# Patient Record
Sex: FEMALE | Employment: UNEMPLOYED | URBAN - METROPOLITAN AREA
[De-identification: names, ages, dates, MRNs, and addresses within clinical notes are randomized per-mention and may not be internally consistent; named-entity substitution may affect disease eponyms.]

---

## 2021-01-29 ENCOUNTER — IMMUNIZATION (OUTPATIENT)
Dept: FAMILY MEDICINE CLINIC | Age: 86
End: 2021-01-29
Payer: MEDICARE

## 2021-01-29 PROCEDURE — 91301 COVID-19, MODERNA VACCINE 100MCG/0.5ML DOSE: CPT | Performed by: INTERNAL MEDICINE

## 2021-01-29 PROCEDURE — 0011A COVID-19, MODERNA VACCINE 100MCG/0.5ML DOSE: CPT | Performed by: INTERNAL MEDICINE

## 2021-02-26 ENCOUNTER — IMMUNIZATION (OUTPATIENT)
Dept: FAMILY MEDICINE CLINIC | Age: 86
End: 2021-02-26
Payer: MEDICARE

## 2021-02-26 PROCEDURE — 0012A COVID-19, MODERNA VACCINE 100MCG/0.5ML DOSE: CPT | Performed by: INTERNAL MEDICINE

## 2021-02-26 PROCEDURE — 91301 COVID-19, MODERNA VACCINE 100MCG/0.5ML DOSE: CPT | Performed by: INTERNAL MEDICINE

## 2021-09-22 ENCOUNTER — APPOINTMENT (OUTPATIENT)
Dept: CARDIAC CATH/INVASIVE PROCEDURES | Age: 86
DRG: 242 | End: 2021-09-22
Payer: MEDICARE

## 2021-09-22 ENCOUNTER — APPOINTMENT (OUTPATIENT)
Dept: GENERAL RADIOLOGY | Age: 86
DRG: 242 | End: 2021-09-22
Payer: MEDICARE

## 2021-09-22 ENCOUNTER — HOSPITAL ENCOUNTER (INPATIENT)
Age: 86
LOS: 5 days | Discharge: HOME HEALTH | DRG: 242 | End: 2021-09-27
Attending: EMERGENCY MEDICINE | Admitting: INTERNAL MEDICINE
Payer: MEDICARE

## 2021-09-22 DIAGNOSIS — R00.1 BRADYCARDIA: ICD-10-CM

## 2021-09-22 DIAGNOSIS — I47.20 VENTRICULAR TACHYCARDIA: ICD-10-CM

## 2021-09-22 DIAGNOSIS — R55 SYNCOPE AND COLLAPSE: Primary | ICD-10-CM

## 2021-09-22 PROBLEM — R60.0 PEDAL EDEMA: Status: ACTIVE | Noted: 2021-09-22

## 2021-09-22 PROBLEM — J90 PLEURAL EFFUSION: Status: ACTIVE | Noted: 2021-09-22

## 2021-09-22 PROBLEM — I49.9 CARDIAC DYSRHYTHMIA: Status: ACTIVE | Noted: 2021-09-22
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
## 2021-09-22 LAB
ALBUMIN SERPL-MCNC: 3.6 G/DL (ref 3.5–5.2)
ALBUMIN/GLOBULIN RATIO: 1.1 (ref 1–2.5)
ALLEN TEST: ABNORMAL
ALLEN TEST: ABNORMAL
ALP BLD-CCNC: 106 U/L (ref 35–104)
ALT SERPL-CCNC: 25 U/L (ref 5–33)
ANION GAP SERPL CALCULATED.3IONS-SCNC: 13 MMOL/L (ref 9–17)
ANION GAP SERPL CALCULATED.3IONS-SCNC: 14 MMOL/L (ref 9–17)
ANION GAP SERPL CALCULATED.3IONS-SCNC: 16 MMOL/L (ref 9–17)
ANION GAP: 10 MMOL/L (ref 7–16)
ANION GAP: 18 MMOL/L (ref 7–16)
AST SERPL-CCNC: 28 U/L
BILIRUB SERPL-MCNC: 1.67 MG/DL (ref 0.3–1.2)
BILIRUBIN DIRECT: 0.65 MG/DL
BILIRUBIN, INDIRECT: 1.02 MG/DL (ref 0–1)
BNP INTERPRETATION: ABNORMAL
BUN BLDV-MCNC: 13 MG/DL (ref 8–23)
BUN/CREAT BLD: ABNORMAL (ref 9–20)
CALCIUM SERPL-MCNC: 9.5 MG/DL (ref 8.6–10.4)
CALCIUM SERPL-MCNC: 9.5 MG/DL (ref 8.6–10.4)
CALCIUM SERPL-MCNC: 9.7 MG/DL (ref 8.6–10.4)
CHLORIDE BLD-SCNC: 102 MMOL/L (ref 98–107)
CHLORIDE BLD-SCNC: 99 MMOL/L (ref 98–107)
CHLORIDE BLD-SCNC: 99 MMOL/L (ref 98–107)
CO2: 24 MMOL/L (ref 20–31)
CREAT SERPL-MCNC: 0.55 MG/DL (ref 0.5–0.9)
CREAT SERPL-MCNC: 0.58 MG/DL (ref 0.5–0.9)
CREAT SERPL-MCNC: 0.64 MG/DL (ref 0.5–0.9)
FIO2: ABNORMAL
FIO2: ABNORMAL
GFR AFRICAN AMERICAN: >60 ML/MIN
GFR NON-AFRICAN AMERICAN: >60 ML/MIN
GFR SERPL CREATININE-BSD FRML MDRD: >60 ML/MIN
GFR SERPL CREATININE-BSD FRML MDRD: >60 ML/MIN
GFR SERPL CREATININE-BSD FRML MDRD: ABNORMAL ML/MIN/{1.73_M2}
GFR SERPL CREATININE-BSD FRML MDRD: NORMAL ML/MIN/{1.73_M2}
GFR SERPL CREATININE-BSD FRML MDRD: NORMAL ML/MIN/{1.73_M2}
GLOBULIN: ABNORMAL G/DL (ref 1.5–3.8)
GLUCOSE BLD-MCNC: 134 MG/DL (ref 74–100)
GLUCOSE BLD-MCNC: 141 MG/DL (ref 70–99)
GLUCOSE BLD-MCNC: 141 MG/DL (ref 70–99)
GLUCOSE BLD-MCNC: 146 MG/DL (ref 74–100)
GLUCOSE BLD-MCNC: 181 MG/DL (ref 70–99)
HCO3 VENOUS: 27 MMOL/L (ref 22–29)
HCT VFR BLD CALC: 49 % (ref 36.3–47.1)
HEMOGLOBIN: 15.8 G/DL (ref 11.9–15.1)
LACTIC ACID, WHOLE BLOOD: 2.1 MMOL/L (ref 0.7–2.1)
LV EF: 38 %
LVEF MODALITY: NORMAL
MAGNESIUM: 2.2 MG/DL (ref 1.6–2.6)
MAGNESIUM: 2.4 MG/DL (ref 1.6–2.6)
MAGNESIUM: 2.5 MG/DL (ref 1.6–2.6)
MCH RBC QN AUTO: 32.3 PG (ref 25.2–33.5)
MCHC RBC AUTO-ENTMCNC: 32.2 G/DL (ref 28.4–34.8)
MCV RBC AUTO: 100.2 FL (ref 82.6–102.9)
MODE: ABNORMAL
MODE: ABNORMAL
NEGATIVE BASE EXCESS, ART: 1 (ref 0–2)
NEGATIVE BASE EXCESS, VEN: ABNORMAL (ref 0–2)
NRBC AUTOMATED: 0 PER 100 WBC
O2 DEVICE/FLOW/%: ABNORMAL
O2 DEVICE/FLOW/%: ABNORMAL
O2 SAT, VEN: 43 % (ref 60–85)
PATIENT TEMP: ABNORMAL
PATIENT TEMP: ABNORMAL
PCO2, VEN: 33.7 MM HG (ref 41–51)
PDW BLD-RTO: 13.9 % (ref 11.8–14.4)
PH VENOUS: 7.51 (ref 7.32–7.43)
PLATELET # BLD: ABNORMAL K/UL (ref 138–453)
PLATELET, FLUORESCENCE: 154 K/UL (ref 138–453)
PLATELET, IMMATURE FRACTION: 15.2 % (ref 1.1–10.3)
PMV BLD AUTO: ABNORMAL FL (ref 8.1–13.5)
PO2, VEN: 21.4 MM HG (ref 30–50)
POC BUN: 10 MG/DL (ref 8–26)
POC BUN: 14 MG/DL (ref 8–26)
POC CHLORIDE: 114 MMOL/L (ref 98–107)
POC CHLORIDE: 98 MMOL/L (ref 98–107)
POC CREATININE: 0.52 MG/DL (ref 0.51–1.19)
POC CREATININE: 0.65 MG/DL (ref 0.51–1.19)
POC HCO3: 21.5 MMOL/L (ref 21–28)
POC HEMATOCRIT: 38 % (ref 36–46)
POC HEMATOCRIT: 52 % (ref 36–46)
POC HEMOGLOBIN: 13.1 G/DL (ref 12–16)
POC HEMOGLOBIN: 17.8 G/DL (ref 12–16)
POC IONIZED CALCIUM: 1.06 MMOL/L (ref 1.15–1.33)
POC IONIZED CALCIUM: 1.12 MMOL/L (ref 1.15–1.33)
POC LACTIC ACID: 1.23 MMOL/L (ref 0.56–1.39)
POC LACTIC ACID: 3.58 MMOL/L (ref 0.56–1.39)
POC O2 SATURATION: 98 % (ref 94–98)
POC PCO2 TEMP: ABNORMAL MM HG
POC PCO2 TEMP: ABNORMAL MM HG
POC PCO2: 29.4 MM HG (ref 35–48)
POC PH TEMP: ABNORMAL
POC PH TEMP: ABNORMAL
POC PH: 7.47 (ref 7.35–7.45)
POC PO2 TEMP: ABNORMAL MM HG
POC PO2 TEMP: ABNORMAL MM HG
POC PO2: 91.2 MM HG (ref 83–108)
POC POTASSIUM: 2.3 MMOL/L (ref 3.5–4.5)
POC POTASSIUM: 3 MMOL/L (ref 3.5–4.5)
POC SODIUM: 142 MMOL/L (ref 138–146)
POC SODIUM: 145 MMOL/L (ref 138–146)
POC TCO2: 22 MMOL/L (ref 22–30)
POC TCO2: 27 MMOL/L (ref 22–30)
POSITIVE BASE EXCESS, ART: ABNORMAL (ref 0–3)
POSITIVE BASE EXCESS, VEN: 4 (ref 0–3)
POTASSIUM SERPL-SCNC: 3.1 MMOL/L (ref 3.7–5.3)
POTASSIUM SERPL-SCNC: 3.3 MMOL/L (ref 3.7–5.3)
POTASSIUM SERPL-SCNC: 3.7 MMOL/L (ref 3.7–5.3)
PRO-BNP: 6816 PG/ML
RBC # BLD: 4.89 M/UL (ref 3.95–5.11)
SAMPLE SITE: ABNORMAL
SAMPLE SITE: ABNORMAL
SARS-COV-2, RAPID: NOT DETECTED
SODIUM BLD-SCNC: 137 MMOL/L (ref 135–144)
SODIUM BLD-SCNC: 139 MMOL/L (ref 135–144)
SODIUM BLD-SCNC: 139 MMOL/L (ref 135–144)
SPECIMEN DESCRIPTION: NORMAL
TCO2 (CALC), ART: ABNORMAL MMOL/L (ref 22–29)
THYROXINE, FREE: 3.08 NG/DL (ref 0.93–1.7)
TOTAL CO2, VENOUS: ABNORMAL MMOL/L (ref 23–30)
TOTAL PROTEIN: 6.8 G/DL (ref 6.4–8.3)
TROPONIN INTERP: ABNORMAL
TROPONIN INTERP: ABNORMAL
TROPONIN T: ABNORMAL NG/ML
TROPONIN T: ABNORMAL NG/ML
TROPONIN, HIGH SENSITIVITY: 24 NG/L (ref 0–14)
TROPONIN, HIGH SENSITIVITY: 24 NG/L (ref 0–14)
TSH SERPL DL<=0.05 MIU/L-ACNC: 0.11 MIU/L (ref 0.3–5)
WBC # BLD: 12.9 K/UL (ref 3.5–11.3)

## 2021-09-22 PROCEDURE — 6360000002 HC RX W HCPCS: Performed by: STUDENT IN AN ORGANIZED HEALTH CARE EDUCATION/TRAINING PROGRAM

## 2021-09-22 PROCEDURE — 71045 X-RAY EXAM CHEST 1 VIEW: CPT

## 2021-09-22 PROCEDURE — 82330 ASSAY OF CALCIUM: CPT

## 2021-09-22 PROCEDURE — 36415 COLL VENOUS BLD VENIPUNCTURE: CPT

## 2021-09-22 PROCEDURE — 84484 ASSAY OF TROPONIN QUANT: CPT

## 2021-09-22 PROCEDURE — 82803 BLOOD GASES ANY COMBINATION: CPT

## 2021-09-22 PROCEDURE — 87635 SARS-COV-2 COVID-19 AMP PRB: CPT

## 2021-09-22 PROCEDURE — 93005 ELECTROCARDIOGRAM TRACING: CPT | Performed by: EMERGENCY MEDICINE

## 2021-09-22 PROCEDURE — 2580000003 HC RX 258: Performed by: STUDENT IN AN ORGANIZED HEALTH CARE EDUCATION/TRAINING PROGRAM

## 2021-09-22 PROCEDURE — 96375 TX/PRO/DX INJ NEW DRUG ADDON: CPT

## 2021-09-22 PROCEDURE — 83605 ASSAY OF LACTIC ACID: CPT

## 2021-09-22 PROCEDURE — B2111ZZ FLUOROSCOPY OF MULTIPLE CORONARY ARTERIES USING LOW OSMOLAR CONTRAST: ICD-10-PCS | Performed by: INTERNAL MEDICINE

## 2021-09-22 PROCEDURE — 99223 1ST HOSP IP/OBS HIGH 75: CPT | Performed by: INTERNAL MEDICINE

## 2021-09-22 PROCEDURE — 82947 ASSAY GLUCOSE BLOOD QUANT: CPT

## 2021-09-22 PROCEDURE — 81001 URINALYSIS AUTO W/SCOPE: CPT

## 2021-09-22 PROCEDURE — 2500000003 HC RX 250 WO HCPCS

## 2021-09-22 PROCEDURE — 85055 RETICULATED PLATELET ASSAY: CPT

## 2021-09-22 PROCEDURE — 5A1223Z PERFORMANCE OF CARDIAC PACING, CONTINUOUS: ICD-10-PCS | Performed by: INTERNAL MEDICINE

## 2021-09-22 PROCEDURE — 99283 EMERGENCY DEPT VISIT LOW MDM: CPT

## 2021-09-22 PROCEDURE — 6360000002 HC RX W HCPCS

## 2021-09-22 PROCEDURE — 4A023N7 MEASUREMENT OF CARDIAC SAMPLING AND PRESSURE, LEFT HEART, PERCUTANEOUS APPROACH: ICD-10-PCS | Performed by: INTERNAL MEDICINE

## 2021-09-22 PROCEDURE — 02HK3JZ INSERTION OF PACEMAKER LEAD INTO RIGHT VENTRICLE, PERCUTANEOUS APPROACH: ICD-10-PCS | Performed by: INTERNAL MEDICINE

## 2021-09-22 PROCEDURE — 94660 CPAP INITIATION&MGMT: CPT

## 2021-09-22 PROCEDURE — 93454 CORONARY ARTERY ANGIO S&I: CPT | Performed by: INTERNAL MEDICINE

## 2021-09-22 PROCEDURE — 80051 ELECTROLYTE PANEL: CPT

## 2021-09-22 PROCEDURE — 2709999900 HC NON-CHARGEABLE SUPPLY

## 2021-09-22 PROCEDURE — 84520 ASSAY OF UREA NITROGEN: CPT

## 2021-09-22 PROCEDURE — C1760 CLOSURE DEV, VASC: HCPCS

## 2021-09-22 PROCEDURE — 6360000004 HC RX CONTRAST MEDICATION

## 2021-09-22 PROCEDURE — 84439 ASSAY OF FREE THYROXINE: CPT

## 2021-09-22 PROCEDURE — 2000000000 HC ICU R&B

## 2021-09-22 PROCEDURE — 83735 ASSAY OF MAGNESIUM: CPT

## 2021-09-22 PROCEDURE — 84443 ASSAY THYROID STIM HORMONE: CPT

## 2021-09-22 PROCEDURE — 6360000002 HC RX W HCPCS: Performed by: EMERGENCY MEDICINE

## 2021-09-22 PROCEDURE — 85027 COMPLETE CBC AUTOMATED: CPT

## 2021-09-22 PROCEDURE — 83880 ASSAY OF NATRIURETIC PEPTIDE: CPT

## 2021-09-22 PROCEDURE — 2060000000 HC ICU INTERMEDIATE R&B

## 2021-09-22 PROCEDURE — 6370000000 HC RX 637 (ALT 250 FOR IP): Performed by: STUDENT IN AN ORGANIZED HEALTH CARE EDUCATION/TRAINING PROGRAM

## 2021-09-22 PROCEDURE — C1894 INTRO/SHEATH, NON-LASER: HCPCS

## 2021-09-22 PROCEDURE — 2500000003 HC RX 250 WO HCPCS: Performed by: STUDENT IN AN ORGANIZED HEALTH CARE EDUCATION/TRAINING PROGRAM

## 2021-09-22 PROCEDURE — 82565 ASSAY OF CREATININE: CPT

## 2021-09-22 PROCEDURE — 2580000003 HC RX 258: Performed by: EMERGENCY MEDICINE

## 2021-09-22 PROCEDURE — 93306 TTE W/DOPPLER COMPLETE: CPT

## 2021-09-22 PROCEDURE — 80048 BASIC METABOLIC PNL TOTAL CA: CPT

## 2021-09-22 PROCEDURE — 96365 THER/PROPH/DIAG IV INF INIT: CPT

## 2021-09-22 PROCEDURE — 85014 HEMATOCRIT: CPT

## 2021-09-22 PROCEDURE — 80076 HEPATIC FUNCTION PANEL: CPT

## 2021-09-22 RX ORDER — ACETAMINOPHEN 325 MG/1
650 TABLET ORAL EVERY 4 HOURS PRN
Status: DISCONTINUED | OUTPATIENT
Start: 2021-09-22 | End: 2021-09-22 | Stop reason: SDUPTHER

## 2021-09-22 RX ORDER — SODIUM CHLORIDE 0.9 % (FLUSH) 0.9 %
5-40 SYRINGE (ML) INJECTION PRN
Status: DISCONTINUED | OUTPATIENT
Start: 2021-09-22 | End: 2021-09-22 | Stop reason: SDUPTHER

## 2021-09-22 RX ORDER — NOREPINEPHRINE BIT/0.9 % NACL 16MG/250ML
2-100 INFUSION BOTTLE (ML) INTRAVENOUS CONTINUOUS
Status: DISCONTINUED | OUTPATIENT
Start: 2021-09-22 | End: 2021-09-23

## 2021-09-22 RX ORDER — SODIUM CHLORIDE 0.9 % (FLUSH) 0.9 %
5-40 SYRINGE (ML) INJECTION EVERY 12 HOURS SCHEDULED
Status: DISCONTINUED | OUTPATIENT
Start: 2021-09-22 | End: 2021-09-22 | Stop reason: SDUPTHER

## 2021-09-22 RX ORDER — ONDANSETRON 2 MG/ML
4 INJECTION INTRAMUSCULAR; INTRAVENOUS ONCE
Status: COMPLETED | OUTPATIENT
Start: 2021-09-22 | End: 2021-09-22

## 2021-09-22 RX ORDER — POTASSIUM CHLORIDE 20 MEQ/1
40 TABLET, EXTENDED RELEASE ORAL ONCE
Status: COMPLETED | OUTPATIENT
Start: 2021-09-22 | End: 2021-09-22

## 2021-09-22 RX ORDER — ONDANSETRON 4 MG/1
4 TABLET, ORALLY DISINTEGRATING ORAL EVERY 8 HOURS PRN
Status: DISCONTINUED | OUTPATIENT
Start: 2021-09-22 | End: 2021-09-27 | Stop reason: HOSPADM

## 2021-09-22 RX ORDER — SODIUM CHLORIDE 9 MG/ML
25 INJECTION, SOLUTION INTRAVENOUS PRN
Status: DISCONTINUED | OUTPATIENT
Start: 2021-09-22 | End: 2021-09-27 | Stop reason: HOSPADM

## 2021-09-22 RX ORDER — ESCITALOPRAM OXALATE 5 MG/1
5 TABLET ORAL DAILY
COMMUNITY

## 2021-09-22 RX ORDER — HYDRALAZINE HYDROCHLORIDE 20 MG/ML
10 INJECTION INTRAMUSCULAR; INTRAVENOUS EVERY 6 HOURS PRN
Status: DISCONTINUED | OUTPATIENT
Start: 2021-09-22 | End: 2021-09-27 | Stop reason: HOSPADM

## 2021-09-22 RX ORDER — DOPAMINE HYDROCHLORIDE 160 MG/100ML
INJECTION, SOLUTION INTRAVENOUS
Status: DISCONTINUED
Start: 2021-09-22 | End: 2021-09-23

## 2021-09-22 RX ORDER — ONDANSETRON 2 MG/ML
INJECTION INTRAMUSCULAR; INTRAVENOUS
Status: COMPLETED
Start: 2021-09-22 | End: 2021-09-22

## 2021-09-22 RX ORDER — ACETAMINOPHEN 325 MG/1
650 TABLET ORAL EVERY 6 HOURS PRN
Status: DISCONTINUED | OUTPATIENT
Start: 2021-09-22 | End: 2021-09-27 | Stop reason: HOSPADM

## 2021-09-22 RX ORDER — ONDANSETRON 2 MG/ML
4 INJECTION INTRAMUSCULAR; INTRAVENOUS EVERY 6 HOURS PRN
Status: DISCONTINUED | OUTPATIENT
Start: 2021-09-22 | End: 2021-09-27 | Stop reason: HOSPADM

## 2021-09-22 RX ORDER — POTASSIUM CHLORIDE 7.45 MG/ML
40 INJECTION INTRAVENOUS ONCE
Status: COMPLETED | OUTPATIENT
Start: 2021-09-22 | End: 2021-09-22

## 2021-09-22 RX ORDER — AMIODARONE HYDROCHLORIDE 200 MG/1
400 TABLET ORAL DAILY
Status: ON HOLD | COMMUNITY
End: 2021-09-27 | Stop reason: HOSPADM

## 2021-09-22 RX ORDER — LOSARTAN POTASSIUM AND HYDROCHLOROTHIAZIDE 12.5; 5 MG/1; MG/1
1 TABLET ORAL DAILY
COMMUNITY

## 2021-09-22 RX ORDER — SODIUM CHLORIDE 0.9 % (FLUSH) 0.9 %
5-40 SYRINGE (ML) INJECTION EVERY 12 HOURS SCHEDULED
Status: DISCONTINUED | OUTPATIENT
Start: 2021-09-22 | End: 2021-09-27 | Stop reason: HOSPADM

## 2021-09-22 RX ORDER — SODIUM CHLORIDE 0.9 % (FLUSH) 0.9 %
5-40 SYRINGE (ML) INJECTION PRN
Status: DISCONTINUED | OUTPATIENT
Start: 2021-09-22 | End: 2021-09-27 | Stop reason: HOSPADM

## 2021-09-22 RX ORDER — 0.9 % SODIUM CHLORIDE 0.9 %
500 INTRAVENOUS SOLUTION INTRAVENOUS ONCE
Status: COMPLETED | OUTPATIENT
Start: 2021-09-22 | End: 2021-09-22

## 2021-09-22 RX ORDER — ACETAMINOPHEN 650 MG/1
650 SUPPOSITORY RECTAL EVERY 6 HOURS PRN
Status: DISCONTINUED | OUTPATIENT
Start: 2021-09-22 | End: 2021-09-27 | Stop reason: HOSPADM

## 2021-09-22 RX ORDER — SODIUM CHLORIDE 9 MG/ML
25 INJECTION, SOLUTION INTRAVENOUS PRN
Status: DISCONTINUED | OUTPATIENT
Start: 2021-09-22 | End: 2021-09-22 | Stop reason: SDUPTHER

## 2021-09-22 RX ORDER — LIDOCAINE HYDROCHLORIDE ANHYDROUS AND DEXTROSE MONOHYDRATE .4; 5 G/100ML; G/100ML
1 INJECTION, SOLUTION INTRAVENOUS CONTINUOUS
Status: DISCONTINUED | OUTPATIENT
Start: 2021-09-22 | End: 2021-09-23

## 2021-09-22 RX ORDER — POTASSIUM CHLORIDE 7.45 MG/ML
20 INJECTION INTRAVENOUS ONCE
Status: COMPLETED | OUTPATIENT
Start: 2021-09-22 | End: 2021-09-22

## 2021-09-22 RX ORDER — CALCIUM GLUCONATE 20 MG/ML
2000 INJECTION, SOLUTION INTRAVENOUS ONCE
Status: COMPLETED | OUTPATIENT
Start: 2021-09-22 | End: 2021-09-22

## 2021-09-22 RX ORDER — MAGNESIUM SULFATE IN WATER 40 MG/ML
2000 INJECTION, SOLUTION INTRAVENOUS ONCE
Status: COMPLETED | OUTPATIENT
Start: 2021-09-22 | End: 2021-09-22

## 2021-09-22 RX ORDER — DOPAMINE HYDROCHLORIDE 160 MG/100ML
2-20 INJECTION, SOLUTION INTRAVENOUS CONTINUOUS
Status: DISCONTINUED | OUTPATIENT
Start: 2021-09-22 | End: 2021-09-23

## 2021-09-22 RX ORDER — METOPROLOL SUCCINATE 50 MG/1
50 TABLET, EXTENDED RELEASE ORAL 2 TIMES DAILY
Status: ON HOLD | COMMUNITY
End: 2021-09-27 | Stop reason: HOSPADM

## 2021-09-22 RX ORDER — FUROSEMIDE 10 MG/ML
20 INJECTION INTRAMUSCULAR; INTRAVENOUS ONCE
Status: COMPLETED | OUTPATIENT
Start: 2021-09-22 | End: 2021-09-22

## 2021-09-22 RX ORDER — POLYETHYLENE GLYCOL 3350 17 G/17G
17 POWDER, FOR SOLUTION ORAL DAILY PRN
Status: DISCONTINUED | OUTPATIENT
Start: 2021-09-22 | End: 2021-09-27 | Stop reason: HOSPADM

## 2021-09-22 RX ORDER — POTASSIUM CHLORIDE 750 MG/1
40 CAPSULE, EXTENDED RELEASE ORAL DAILY
Status: DISCONTINUED | OUTPATIENT
Start: 2021-09-22 | End: 2021-09-22

## 2021-09-22 RX ADMIN — SODIUM CHLORIDE, PRESERVATIVE FREE 10 ML: 5 INJECTION INTRAVENOUS at 20:04

## 2021-09-22 RX ADMIN — ONDANSETRON: 2 INJECTION INTRAMUSCULAR; INTRAVENOUS at 10:36

## 2021-09-22 RX ADMIN — POTASSIUM CHLORIDE 40 MEQ: 7.46 INJECTION, SOLUTION INTRAVENOUS at 12:19

## 2021-09-22 RX ADMIN — HYDRALAZINE HYDROCHLORIDE 10 MG: 20 INJECTION INTRAMUSCULAR; INTRAVENOUS at 16:56

## 2021-09-22 RX ADMIN — FUROSEMIDE 20 MG: 10 INJECTION, SOLUTION INTRAMUSCULAR; INTRAVENOUS at 21:10

## 2021-09-22 RX ADMIN — POTASSIUM CHLORIDE 20 MEQ: 7.46 INJECTION, SOLUTION INTRAVENOUS at 15:59

## 2021-09-22 RX ADMIN — LIDOCAINE HYDROCHLORIDE 1 MG/MIN: 4 INJECTION, SOLUTION INTRAVENOUS at 12:25

## 2021-09-22 RX ADMIN — LIDOCAINE HYDROCHLORIDE 100 MG: 20 INJECTION INTRAVENOUS at 12:25

## 2021-09-22 RX ADMIN — POTASSIUM CHLORIDE 40 MEQ: 1500 TABLET, EXTENDED RELEASE ORAL at 15:47

## 2021-09-22 RX ADMIN — FAMOTIDINE 20 MG: 10 INJECTION INTRAVENOUS at 20:04

## 2021-09-22 RX ADMIN — CALCIUM GLUCONATE 2000 MG: 20 INJECTION, SOLUTION INTRAVENOUS at 11:12

## 2021-09-22 RX ADMIN — ONDANSETRON 4 MG: 2 INJECTION, SOLUTION INTRAMUSCULAR; INTRAVENOUS at 13:11

## 2021-09-22 RX ADMIN — AMIODARONE HYDROCHLORIDE 1 MG/MIN: 50 INJECTION, SOLUTION INTRAVENOUS at 10:58

## 2021-09-22 RX ADMIN — SODIUM CHLORIDE 500 ML: 0.9 INJECTION, SOLUTION INTRAVENOUS at 10:57

## 2021-09-22 RX ADMIN — SODIUM CHLORIDE, PRESERVATIVE FREE 40 ML: 5 INJECTION INTRAVENOUS at 20:11

## 2021-09-22 RX ADMIN — MAGNESIUM SULFATE HEPTAHYDRATE 2000 MG: 40 INJECTION, SOLUTION INTRAVENOUS at 10:58

## 2021-09-22 RX ADMIN — DOPAMINE HYDROCHLORIDE 2 MCG/KG/MIN: 160 INJECTION, SOLUTION INTRAVENOUS at 12:31

## 2021-09-22 RX ADMIN — ONDANSETRON: 2 INJECTION, SOLUTION INTRAMUSCULAR; INTRAVENOUS at 10:36

## 2021-09-22 ASSESSMENT — ENCOUNTER SYMPTOMS
SORE THROAT: 0
BACK PAIN: 0
VOMITING: 0
EYE REDNESS: 0
SHORTNESS OF BREATH: 0
RHINORRHEA: 0
ABDOMINAL DISTENTION: 0
DIARRHEA: 0
NAUSEA: 1
WHEEZING: 0
COUGH: 0
BLOOD IN STOOL: 0
CHEST TIGHTNESS: 0
PHOTOPHOBIA: 0
CONSTIPATION: 0
VOMITING: 1

## 2021-09-22 ASSESSMENT — PAIN SCALES - GENERAL: PAINLEVEL_OUTOF10: 0

## 2021-09-22 NOTE — H&P
Attending Supervising Physicians Attestation Statement  I have seen and examined Gaurang Delcid and the key elements of all parts of the encounter have been performed by me. I agree with the assessment, plan and orders as documented by the Advanced Practice Provider. I discussed the findings and plans with the resident physician and agree as documented in their note . See resident note for more details    In addition to the pertinent positives and negatives as stated within HPI and the review of systems as documented in the notes, all other systems were reviewed when able to and are reported negative. Additional Comments:   81 F presented with fatigue  Found to be in V tach, given amio converted to NSR  Bradycardic in ER  Underwent emergent pacemaker placement and cath    Principal Problem:    Ventricular tachycardia ([address]  Active Problems:    Cardiac dysrhythmia    Syncope and collapse    Severe sinus bradycardia  Resolved Problems:    * No resolved hospital problems.  *    - cardiology consulted - s/p pacemaker/cath  - on dopamine, lidocaine gtt  - continue ICU monitoring     Electronically signed by Swetha Taylor MD on 9/22/2021 at 5:42 PM

## 2021-09-22 NOTE — Clinical Note
Patient Class: Inpatient [101]   REQUIRED: Diagnosis: Cardiac dysrhythmia [712193]   Estimated Length of Stay: Estimated stay of more than 2 midnights   Telemetry/Cardiac Monitoring Required?: No

## 2021-09-22 NOTE — ED NOTES
EPOC being run at bedside. Patient with symptomatic run of vtach, Dr Yu Campbell aware.      Maggie Smith, RN  09/22/21 [id number]

## 2021-09-22 NOTE — CONSULTS
Attestation signed by      Attending Physician Statement:    I have discussed the care of  Juana Rowell , including pertinent history and exam findings, with the Cardiology fellow/resident. I have seen and examined the patient and the key elements of all parts of the encounter have been performed by me. I agree with the assessment, plan and orders as documented by the fellow/resident, after I modified exam findings and plan of treatments, and the final version is my approved version of the assessment. Additional Comments:     70-year-old female with known history of paroxysmal atrial fibrillation, on amiodarone and Eliquis at home in addition to metoprolol, not following with any cardiologist admitted with 2 episodes of sudden unprovoked syncope. No chest pain, palpitations, dizziness or lightheadedness prior to the episodes. Was noted to be in V-tach as per EMS on initial evaluation. Was given 150 mg of amiodarone with conversion to sinus rhythm. On arrival to ED she was noted to be markedly bradycardic with heart rate ranging 30-40's along with frequent PVCs in bigeminal manner along with frequent runs of nonsustained ventricular tachycardia likely secondary to R on T phenomenan. Initial labs showed potassium of 3.1 and  High sensitivity troponins of 24. Will proceed with emergent temporary pacemaker placement due to severe bradycardia and bradycardia induced torsades. Hold amiodarone and lopressor. IV lidocaine in case recurrent VTs. Will need ischemia workup with coronary angiography post temporary pacemaker. Will also consult EP for further  Evaluation. Risks and benefits of temporary pacemaker placement were discussed with patient along with explanation of potential complications. She and her family verbalized understanding and willing to proceed.        [facility]   Consultation Note               Today's Date: 9/22/2021  Patient Name: Juana Rowell  Date of sodium chloride 0.9 % 250 mL infusion, 2-100 mcg/min, IntraVENous, Continuous  potassium chloride (MICRO-K) extended release capsule 40 mEq, 40 mEq, Oral, Daily  lidocaine (CARDIAC INFUSION) 2 g in dextrose 5% 500 mL infusion, 1 mg/min, IntraVENous, Continuous  DOPamine (INTROPIN) 400 mg in dextrose 5 % 250 mL infusion, 2-20 mcg/kg/min, IntraVENous, Continuous      Allergies:  Patient has no known allergies. Social History:   reports that she has never smoked. She has never used smokeless tobacco.       Family History: family history is not on file. No h/o sudden cardiac death. No for premature CAD      REVIEW OF SYSTEMS:    Constitutional: there has been no unanticipated weight loss. Eyes: No visual changes or diplopia. ENT: No Headaches  Cardiovascular: see above  Respiratory: No previous pulmonary problems, No cough  Gastrointestinal: No abdominal pain. No change in bowel or bladder habits. Genitourinary: No dysuria, trouble voiding, or hematuria. Musculoskeletal:  No gait disturbance, No weakness or joint complaints. Integumentary: No rash or pruritis. Neurological: No headache, diplopia      PHYSICAL EXAM:      BP (!) 87/42   Pulse (!) 47   Temp 96.6 °F (35.9 °C)   Resp 17   Ht 5' 5\" (1.651 m)   Wt 165 lb (74.8 kg)   SpO2 95%   BMI 27.46 kg/m²    Constitutional and General Appearance: Alert, no distress  Respiratory:  No increased work of breathing. Clear to auscultation bilaterally. No wheeze or crackles. Cardiovascular:  Normal S1 and S2.   Jugular venous pulsation Normal  Abdomen:   Soft  No tenderness  Extremities:  No lower extremity edema  Neurologic:  Alert and oriented.   Moves all extremities well      DATA:    Diagnostics:    Labs:     CBC:   Recent Labs     09/22/21  1042   WBC 12.9*   HGB 15.8*   HCT 49.0*   PLT See Reflexed IPF Result     BMP:   Recent Labs     09/22/21  1042 09/22/21  1046     --    K 3.1*  --    CO2 24  --    BUN 13  --    CREATININE 0.64 0.65 LABGLOM >60 >60   GLUCOSE 141*  --      BNP: No results for input(s): BNP in the last 72 hours. PT/INR: No results for input(s): PROTIME, INR in the last 72 hours. APTT:No results for input(s): APTT in the last 72 hours. CARDIAC ENZYMES:  Recent Labs     09/22/21  1042   TROPHS 24*     No results for input(s): CKTOTAL, CKMB, CKMBINDEX, TROPONINI in the last 72 hours. Invalid input(s):  TROPONINT  Recent Labs     09/22/21  1042   TROPONINT NOT REPORTED     FASTING LIPID PANEL:No results found for: HDL, LDLDIRECT, LDLCALC, TRIG  LIVER PROFILE:No results for input(s): AST, ALT, LABALBU in the last 72 hours. EKG: bradycardia with bigemny. Strips concerning for bradycardia induced Torsades        IMPRESSION:    Syncope  Bradycardia  Vtach, concerns for torsades  Hypotension  Inadvertent amiodarone dosing    Patient Active Problem List   Diagnosis    Cardiac dysrhythmia    Syncope and collapse         RECOMMENDATIONS:  Discontinue amiodarone gtt. Switch to lidocaine gtt. Will take patient for temporary pacing given potential bradycardia induced torsades  TTE  Patient took eliquis this morning. Hold eliquis for now          Thank you for allowing us to participate in [facility]. Will follow with you.       Electronically signed on 09/22/21 at 1:24 PM by:    Forrest Swanson MD, MD   Fellow, [address]

## 2021-09-22 NOTE — ED NOTES
Patient to ED via EMS with c/o syncopal episode with two runs of vtach. Patient initially lost consciousness and fell, striking the back of her head on the floor from standing, this was witnessed by her daughters who are at the bedside. The patient then lost consciousness for approx 15 seconds per EMS en route while in vtach and converted on her own. The patient arrives alert and oriented x4, placed in gown and on full cardiac monitor. She reports she was feeling dizzy and having nausea yesterday. The patient is greeted in room 26 by Dr Altagracia Perea, placed on full cardiac monitor and on the lifepack.        Anabell Stearns RN  09/22/21 0374

## 2021-09-22 NOTE — ED NOTES
Patient transported to cath lab via stretcher by cath team on lifepack.      Joaquina Rubio RN  09/22/21 0875

## 2021-09-22 NOTE — H&P
[address]     Department of Internal Medicine - Staff Internal Medicine Teaching Service          ADMISSION NOTE/HISTORY AND PHYSICAL EXAMINATION   Date: 9/22/2021  Patient Name: Shreya Sorto  Date of admission: 9/22/2021 10:22 AM  YOB: 1935  PCP: Lisha Chan MD  History Obtained From:  Patient and EMR    CHIEF COMPLAINT     Chief complaint: Syncopal episode    HISTORY OF PRESENTING ILLNESS     The patient is a pleasant 81 y.o. female presents with a chief complaint of syncopal episode. The patient has PMH significant of hypertension. The patient reported that she has been feeling well since yesterday with decreased appetite, nausea and dry heaving. The patient's daughter called EMS this morning after she had a syncopal episode. She reported feeling dizzy after which she \"blacked out\" and fell and hit her head on the carpet. The patient did not have any abnormal shaking or jerking movements or urinary or fecal incontinence. Per EMS, the patient was in sustained V. tach and she was given 150 mg of amiodarone after which V. tach was spontaneously resolved. Patient reported taking all her medications in the morning. Patient denies any cardiac history or following up with any cardiologist.    In the ED, the patient found to be bradycardic HR 48, RR 25, /52 SPO2 92% on room air. EKG showed bradycardia bigeminy no ST elevation was seen. Labs showed K 3.1, Trop 24, Pro-BNP 6816, WBC 12.9, Hgb 15.8, Plt 154. Patient was started on amiodarone drip and potassium 100 mEq, Mag 2g and calcium 2g was replaced. Cardiology was consulted and patient was having 4-8 beat runs of V. Tach in the ED. Subsequently, patient's blood pressure was dropped to 77/65. Cardiolgy stopped amiodarone and switched to Dopamine and also resumed Lidocaine. Patient was taken for emergent temporary pacemaker for possible bradycardic induced torsades and left heart cath.   Patient was admitted to cardiac ICU for further management. Review of Systems:  Review of Systems   Constitutional: Negative for chills, fatigue and fever. HENT: Negative for mouth sores, rhinorrhea and sneezing. Eyes: Negative for photophobia, redness and visual disturbance. Respiratory: Negative for cough, chest tightness and shortness of breath. Cardiovascular: Positive for leg swelling. Negative for chest pain and palpitations. Gastrointestinal: Positive for nausea. Negative for blood in stool, diarrhea and vomiting. Endocrine: Negative for polydipsia, polyphagia and polyuria. Genitourinary: Negative for dysuria, frequency and urgency. Musculoskeletal: Negative for arthralgias, back pain and neck pain. Neurological: Positive for syncope. Negative for tremors, seizures, numbness and headaches. Psychiatric/Behavioral: Negative for agitation, behavioral problems and confusion. PAST MEDICAL HISTORY     History reviewed. No pertinent past medical history. PAST SURGICAL HISTORY     History reviewed. No pertinent surgical history. ALLERGIES     Patient has no known allergies. MEDICATIONS PRIOR TO ADMISSION     Prior to Admission medications    Medication Sig Start Date End Date Taking?  Authorizing Provider   apixaban (ELIQUIS) 5 MG TABS tablet Take 5 mg by mouth 2 times daily   Yes Historical Provider, MD   losartan-hydroCHLOROthiazide (HYZAAR) 50-12.5 MG per tablet Take 1 tablet by mouth daily   Yes Historical Provider, MD   amiodarone (CORDARONE) 200 MG tablet Take 400 mg by mouth daily   Yes Historical Provider, MD   metoprolol succinate (TOPROL XL) 50 MG extended release tablet Take 50 mg by mouth 2 times daily    Yes Historical Provider, MD   Calcium-Vitamin D 600-200 MG-UNIT TABS Take 1 tablet by mouth 2 times daily    Yes Historical Provider, MD   escitalopram (LEXAPRO) 5 MG tablet Take 5 mg by mouth daily  Patient not taking: Reported on 9/22/2021    Historical Provider, MD SOCIAL HISTORY     Tobacco: Never smoked  Alcohol: Quit many years ago  Illicits: Denies use  Occupation: N/A    FAMILY HISTORY     History reviewed. No pertinent family history. PHYSICAL EXAM     Vitals: BP (!) 115/58   Pulse 60   Temp 98.4 °F (36.9 °C) (Oral)   Resp 16   Ht 5' 5\" (1.651 m)   Wt 165 lb (74.8 kg)   SpO2 96%   BMI 27.46 kg/m²   Tmax: Temp (24hrs), Av.1 °F (36.2 °C), Min:95.7 °F (35.4 °C), Max:98.4 °F (36.9 °C)    Last Body weight:   Wt Readings from Last 3 Encounters:   21 165 lb (74.8 kg)     Body Mass Index : Body mass index is 27.46 kg/m². PHYSICAL EXAMINATION:  Physical Exam  Constitutional:       General: She is not in acute distress. Appearance: She is not ill-appearing. HENT:      Head: Normocephalic and atraumatic. Cardiovascular:      Rate and Rhythm: Normal rate and regular rhythm. Pulses: Normal pulses. Heart sounds: No murmur heard. No gallop. Pulmonary:      Effort: Pulmonary effort is normal. No respiratory distress. Breath sounds: Normal breath sounds. No wheezing. Abdominal:      General: Bowel sounds are normal. There is no distension. Palpations: Abdomen is soft. Tenderness: There is no abdominal tenderness. There is no guarding. Musculoskeletal:         General: Normal range of motion. Right lower leg: Edema present. Left lower leg: Edema present. Neurological:      General: No focal deficit present. Mental Status: She is alert and oriented to person, place, and time. Psychiatric:         Mood and Affect: Mood normal.         Behavior: Behavior normal.         Thought Content:  Thought content normal.           INVESTIGATIONS     Laboratory Testing:     Recent Results (from the past 24 hour(s))   CBC    Collection Time: 21 10:42 AM   Result Value Ref Range    WBC 12.9 (H) 3.5 - 11.3 k/uL    RBC 4.89 3.95 - 5.11 m/uL    Hemoglobin 15.8 (H) 11.9 - 15.1 g/dL    Hematocrit 49.0 (H) 36.3 - (H) <0.31 mg/dL    Bilirubin, Indirect 1.02 (H) 0.00 - 1.00 mg/dL    Total Protein 6.8 6.4 - 8.3 g/dL    Globulin NOT REPORTED 1.5 - 3.8 g/dL    Albumin/Globulin Ratio 1.1 1.0 - 2.5   Venous Blood Gas, POC    Collection Time: 09/22/21 10:46 AM   Result Value Ref Range    pH, Jesus Manuel 7.511 (H) 7.320 - 7.430    pCO2, Jesus Manuel 33.7 (L) 41.0 - 51.0 mm Hg    pO2, Jesus Manuel 21.4 (L) 30 - 50 mm Hg    HCO3, Venous 27.0 22.0 - 29.0 mmol/L    Total CO2, Venous NOT REPORTED 23.0 - 30.0 mmol/L    Negative Base Excess, Jesus Manuel NOT REPORTED 0.0 - 2.0    Positive Base Excess, Jesus Manuel 4 (H) 0.0 - 3.0    O2 Sat, Jesus Manuel 43 (L) 60.0 - 85.0 %    O2 Device/Flow/% NOT REPORTED     John Test NOT REPORTED     Sample Site NOT REPORTED     Mode NOT REPORTED     FIO2 NOT REPORTED     Pt Temp NOT REPORTED     POC pH Temp NOT REPORTED     POC pCO2 Temp NOT REPORTED mm Hg    POC pO2 Temp NOT REPORTED mm Hg   ELECTROLYTES PLUS    Collection Time: 09/22/21 10:46 AM   Result Value Ref Range    POC Sodium 142 138 - 146 mmol/L    POC Potassium 3.0 (L) 3.5 - 4.5 mmol/L    POC Chloride 98 98 - 107 mmol/L    POC TCO2 27 22 - 30 mmol/L    Anion Gap 18 (H) 7 - 16 mmol/L   Hemoglobin and hematocrit, blood    Collection Time: 09/22/21 10:46 AM   Result Value Ref Range    POC Hemoglobin 17.8 (H) 12.0 - 16.0 g/dL    POC Hematocrit 52 (H) 36 - 46 %   Creatinine W/GFR Point of Care    Collection Time: 09/22/21 10:46 AM   Result Value Ref Range    POC Creatinine 0.65 0.51 - 1.19 mg/dL    GFR Comment >60 >60 mL/min    GFR Non-African American >60 >60 mL/min    GFR Comment         CALCIUM, IONIC (POC)    Collection Time: 09/22/21 10:46 AM   Result Value Ref Range    POC Ionized Calcium 1.12 (L) 1.15 - 1.33 mmol/L   POCT urea (BUN)    Collection Time: 09/22/21 10:46 AM   Result Value Ref Range    POC BUN 14 8 - 26 mg/dL   Lactic Acid, POC    Collection Time: 09/22/21 10:46 AM   Result Value Ref Range    POC Lactic Acid 3.58 (H) 0.56 - 1.39 mmol/L   POCT Glucose    Collection Time: 09/22/21 10:46 AM   Result Value Ref Range    POC Glucose 146 (H) 74 - 100 mg/dL   LACTIC ACID, WHOLE BLOOD    Collection Time: 09/22/21 12:53 PM   Result Value Ref Range    Lactic Acid, Whole Blood 2.1 0.7 - 2.1 mmol/L   COVID-19, Rapid    Collection Time: 09/22/21  1:10 PM    Specimen: Nasopharyngeal Swab   Result Value Ref Range    Specimen Description . NASOPHARYNGEAL SWAB     SARS-CoV-2, Rapid Not Detected Not Detected   Arterial Blood Gas, POC    Collection Time: 09/22/21  2:03 PM   Result Value Ref Range    POC pH 7.471 (H) 7.350 - 7.450    POC pCO2 29.4 (L) 35.0 - 48.0 mm Hg    POC PO2 91.2 83.0 - 108.0 mm Hg    POC HCO3 21.5 21.0 - 28.0 mmol/L    TCO2 (calc), Art NOT REPORTED 22.0 - 29.0 mmol/L    Negative Base Excess, Art 1 0.0 - 2.0    Positive Base Excess, Art NOT REPORTED 0.0 - 3.0    POC O2 SAT 98 94.0 - 98.0 %    O2 Device/Flow/% NOT REPORTED     John Test NOT REPORTED     Sample Site NOT REPORTED     Mode NOT REPORTED     FIO2 NOT REPORTED     Pt Temp NOT REPORTED     POC pH Temp NOT REPORTED     POC pCO2 Temp NOT REPORTED mm Hg    POC pO2 Temp NOT REPORTED mm Hg   ELECTROLYTES PLUS    Collection Time: 09/22/21  2:03 PM   Result Value Ref Range    POC Sodium 145 138 - 146 mmol/L    POC Potassium 2.3 (LL) 3.5 - 4.5 mmol/L    POC Chloride 114 (H) 98 - 107 mmol/L    POC TCO2 22 22 - 30 mmol/L    Anion Gap 10 7 - 16 mmol/L   Hemoglobin and hematocrit, blood    Collection Time: 09/22/21  2:03 PM   Result Value Ref Range    POC Hemoglobin 13.1 12.0 - 16.0 g/dL    POC Hematocrit 38 36 - 46 %   Creatinine W/GFR Point of Care    Collection Time: 09/22/21  2:03 PM   Result Value Ref Range    POC Creatinine 0.52 0.51 - 1.19 mg/dL    GFR Comment >60 >60 mL/min    GFR Non-African American >60 >60 mL/min    GFR Comment         CALCIUM, IONIC (POC)    Collection Time: 09/22/21  2:03 PM   Result Value Ref Range    POC Ionized Calcium 1.06 (L) 1.15 - 1.33 mmol/L   POCT urea (BUN)    Collection Time: 09/22/21  2:03 PM   Result Value Ref Range    POC BUN 10 8 - 26 mg/dL   Lactic Acid, POC    Collection Time: 09/22/21  2:03 PM   Result Value Ref Range    POC Lactic Acid 1.23 0.56 - 1.39 mmol/L   POCT Glucose    Collection Time: 09/22/21  2:03 PM   Result Value Ref Range    POC Glucose 134 (H) 74 - 100 mg/dL   Basic Metabolic Panel w/ Reflex to MG    Collection Time: 09/22/21  3:14 PM   Result Value Ref Range    Glucose 141 (H) 70 - 99 mg/dL    BUN 13 8 - 23 mg/dL    CREATININE 0.55 0.50 - 0.90 mg/dL    Bun/Cre Ratio NOT REPORTED 9 - 20    Calcium 9.7 8.6 - 10.4 mg/dL    Sodium 137 135 - 144 mmol/L    Potassium 3.3 (L) 3.7 - 5.3 mmol/L    Chloride 99 98 - 107 mmol/L    CO2 24 20 - 31 mmol/L    Anion Gap 14 9 - 17 mmol/L    GFR Non-African American >60 >60 mL/min    GFR African American >60 >60 mL/min    GFR Comment          GFR Staging NOT REPORTED    Magnesium    Collection Time: 09/22/21  3:14 PM   Result Value Ref Range    Magnesium 2.5 1.6 - 2.6 mg/dL   Troponin    Collection Time: 09/22/21  3:14 PM   Result Value Ref Range    Troponin, High Sensitivity 24 (H) 0 - 14 ng/L    Troponin T NOT REPORTED <0.03 ng/mL    Troponin Interp NOT REPORTED        Imaging:   XR CHEST PORTABLE    Result Date: 9/22/2021  Bilateral pleural effusions with bibasilar opacities that could represent atelectasis or infection       ASSESSMENT & PLAN     ASSESSMENT / PLAN:     IMPRESSION  This is a 81 y.o. female who presented with syncopal episode and found to have V.tach    Principal Problem:    Ventricular tachycardia ([address]  Active Problems:    Cardiac dysrhythmia    Syncope and collapse    Severe sinus bradycardia    Fall at home    Bilateral Pleural effusion    Pedal edema  Resolved Problems:    * No resolved hospital problems. *      V. Tach  - s/p emergent temporary placement due to severe bradycardia induced torsades and LHC today  - LHC: Mild CAD; medical therapy for CAD  - Echo 09/22: LVEF 35-40% and evidence of diastolic dysfunction  - Cardiology following, will follow up recommedations  - EP consulted, will follow-up    Severe sinus bradycardia  - Patient's HR in 30s-40s in ED; ECG showed sinus bradycardia  - s/p emergent temporary placement due to severe bradycardia induced torsades   - patient currently in paced rhythm HR 60s  - Continue Lidocaine gtt per cardiology  - EP consulted, will follow-up    Syncope   - likely due to bradycardia at home  - CT Head ordered, will follow up since patient is on chronic AC at home  - Echo 09/22: LVEF 35-40% and evidence of diastolic dysfunction  - Cardiology following, will follow up recommedations    Bilateral Pleural effusion  - CXR   - Echo 09/22: LVEF 35-40% and evidence of diastolic dysfunction    Paroxysmal A.fib (on Eliquis 5 mg BID)  - Patient is in paced rhythm after pacemaker  - Hold Amiodarone for now per Cardiology      DVT ppx: EPC Cuffs  GI ppx: Pepcid    PT/OT: On board  Discharge Planning:  consulted, will follow-up    Niya Houston MD  Internal Medicine Resident, PGY-1  [address] [facility];  [place], [place]  9/22/2021, 7:45 PM

## 2021-09-22 NOTE — ED PROVIDER NOTES
[facility] ED  Emergency Department        Pt Name: Preeti Montero  MRN: [id number]  [place] 1935  Date of evaluation: 9/22/21    CHIEF COMPLAINT       Chief Complaint   Patient presents with    Loss of Consciousness       HISTORY OF PRESENT ILLNESS  (Location/Symptom, Timing/Onset, Context/Setting, Quality, Duration, ModifyingFactors, Severity.)      Preeti Montero is a 81 y.o. female who presents with feeling unwell yesterday. Decreased appetite and dry heaving and nausea throughout the day. Called EMS this morning as patient continued to feel unwell. Had a syncopal episode after patient was placed on monitor noticed to be in V. tach. Patient then spontaneously resolved was given 150 of amiodarone by EMS. Arrives still feeling unwell heart rate in the 50s. Patient follows with Dr. Tracie Calvillo on PCP is on amiodarone metoprolol as well as Eliquis. Does not have a cardiologist has not had cardiac issues in the past.  Has not started any new recent medications. PAST MEDICAL / SURGICAL / SOCIAL / FAMILY HISTORY      has no past medical history on file. has no past surgical history on file.        Social History     Socioeconomic History    Marital status:      Spouse name: Not on file    Number of children: Not on file    Years of education: Not on file    Highest education level: Not on file   Occupational History    Not on file   Tobacco Use    Smoking status: Never Smoker    Smokeless tobacco: Never Used   Substance and Sexual Activity    Alcohol use: Not on file    Drug use: Not on file    Sexual activity: Not on file   Other Topics Concern    Not on file   Social History Narrative    Not on file     Social Determinants of Health     Financial Resource Strain:     Difficulty of Paying Living Expenses:    Food Insecurity:     Worried About Running Out of Food in the Last Year:     [address] in the Last Year:    Transportation Needs:     Lack of Transportation (Medical):  Lack of Transportation (Non-Medical):    Physical Activity:     Days of Exercise per Week:     Minutes of Exercise per Session:    Stress:     Feeling of Stress :    Social Connections:     Frequency of Communication with Friends and Family:     Frequency of Social Gatherings with Friends and Family:     Attends Taoist Services:     Active Member of Clubs or Organizations:     Attends Club or Organization Meetings:     Marital Status:    Intimate Partner Violence:     Fear of Current or Ex-Partner:     Emotionally Abused:     Physically Abused:     Sexually Abused:        History reviewed. No pertinent family history. Allergies:  Patient has no known allergies. Home Medications:  Prior to Admission medications    Medication Sig Start Date End Date Taking? Authorizing Provider   apixaban (ELIQUIS) 5 MG TABS tablet Take 5 mg by mouth 2 times daily   Yes Historical Provider, MD   escitalopram (LEXAPRO) 5 MG tablet Take 5 mg by mouth daily   Yes Historical Provider, MD   losartan-hydroCHLOROthiazide (HYZAAR) 50-12.5 MG per tablet Take 1 tablet by mouth daily   Yes Historical Provider, MD   amiodarone (CORDARONE) 200 MG tablet Take 400 mg by mouth daily   Yes Historical Provider, MD   metoprolol succinate (TOPROL XL) 100 MG extended release tablet Take 100 mg by mouth daily   Yes Historical Provider, MD   Calcium-Vitamin D 600-200 MG-UNIT TABS Take 1 tablet by mouth 2 times daily    Yes Historical Provider, MD       REVIEW OF SYSTEMS    (2-9 systems for level 4, 10 or more for level 5)      Review of Systems   Constitutional: Positive for activity change and appetite change. Negative for fatigue and fever. HENT: Negative for congestion, rhinorrhea and sore throat. Respiratory: Negative for cough, shortness of breath and wheezing. Cardiovascular: Negative for chest pain, palpitations and leg swelling. Gastrointestinal: Positive for nausea and vomiting. Negative for abdominal distention, constipation and diarrhea. Genitourinary: Negative for decreased urine volume and dysuria. Skin: Negative for rash. Neurological: Positive for dizziness and syncope. Negative for weakness, light-headedness, numbness and headaches. PHYSICAL EXAM   (up to 7 for level 4, 8 or more for level 5)     INITIAL VITALS:   BP (!) 87/42   Pulse (!) 47   Temp 96.6 °F (35.9 °C)   Resp 17   Ht 5' 5\" (1.651 m)   Wt 165 lb (74.8 kg)   SpO2 95%   BMI 27.46 kg/m²     Physical Exam  Constitutional:       General: She is not in acute distress. Appearance: Normal appearance. She is not ill-appearing. Comments: Patient nontoxic-appearing, having intermittent nonsustained V. tach episodes on EKG and on monitor. HENT:      Head: Normocephalic and atraumatic. Eyes:      General:         Right eye: No discharge. Left eye: No discharge. Extraocular Movements: Extraocular movements intact. Pupils: Pupils are equal, round, and reactive to light. Cardiovascular:      Rate and Rhythm: Normal rate. Pulmonary:      Effort: Pulmonary effort is normal. No respiratory distress. Musculoskeletal:      Right lower leg: No edema. Left lower leg: No edema. Neurological:      General: No focal deficit present. Mental Status: She is alert and oriented to person, place, and time. Cranial Nerves: No cranial nerve deficit. Sensory: No sensory deficit. Motor: No weakness. DIFFERENTIAL  DIAGNOSIS     Patient having episodes of nonsustained V. tach. Had a sustained episode with syncope per EMS. On EKG appears to be in bigeminy. We will plan on amiodarone drip, check electrolytes we will replace magnesium and calcium at this time. Assess for additional etiology, check thyroid. Patient is already on amiodarone at home as well as Eliquis. Per her PCP but do not have access to their notes. We will plan on cardiology consultation.   And admission.   We will plan on chest x-ray as patient does describe some \"fluid in her lungs\"    PLAN (LABS / Jacqualyn Few / EKG):  Orders Placed This Encounter   Procedures    COVID-19, Rapid    XR CHEST PORTABLE    CBC    BASIC METABOLIC PANEL    Troponin    Brain Natriuretic Peptide    MAGNESIUM    TSH with Reflex    Urinalysis with microscopic    ELECTROLYTES PLUS    Hemoglobin and hematocrit, blood    CALCIUM, IONIC (POC)    Immature Platelet Fraction    T4, Free    LACTIC ACID, WHOLE BLOOD    Troponin    Height and weight    Inpatient consult to Cardiology    Inpatient consult to Critical Care    Inpatient consult to Internal Medicine    POC CHEMISTRY (NA,K,ICA,GLU,CALC HCT/HGB,LACTATE,CREA,CL)    Venous Blood Gas, POC    Creatinine W/GFR Point of Care    POCT urea (BUN)    Lactic Acid, POC    POCT Glucose    EKG 12 Lead    ECHO Complete 2D W Doppler W Color    Insert peripheral IV    PATIENT STATUS (FROM ED OR OR/PROCEDURAL) Inpatient    PATIENT STATUS (FROM ED OR OR/PROCEDURAL) Inpatient       MEDICATIONS ORDERED:  Orders Placed This Encounter   Medications    ondansetron (ZOFRAN) 4 MG/2ML injection     MARK SMITH: cabinet override    DISCONTD: amiodarone (CORDARONE) 450 mg in dextrose 5 % 250 mL infusion    DISCONTD: amiodarone (CORDARONE) 450 mg in dextrose 5 % 250 mL infusion    ondansetron (ZOFRAN) injection 4 mg    calcium gluconate 2000 mg in sodium chloride 100 mL    0.9 % sodium chloride bolus    magnesium sulfate 2000 mg in 50 mL IVPB premix    norepinephrine (LEVOPHED) 16 mg in sodium chloride 0.9 % 250 mL infusion    potassium chloride 10 mEq/100 mL IVPB (Peripheral Line)    potassium chloride (MICRO-K) extended release capsule 40 mEq    lidocaine (cardiac) (XYLOCAINE) injection 100 mg    lidocaine (CARDIAC INFUSION) 2 g in dextrose 5% 500 mL infusion    DOPamine (INTROPIN) 400 mg in dextrose 5 % 250 mL infusion    DOPamine (INTROPIN) 1.6-5 MG/ML-% infusion     Annabelle Mustafa: cabinet override    ondansetron (ZOFRAN) injection 4 mg       DIAGNOSTIC RESULTS / EMERGENCY DEPARTMENT COURSE / MDM     LABS:  Results for orders placed or performed during the hospital encounter of 09/22/21   CBC   Result Value Ref Range    WBC 12.9 (H) 3.5 - 11.3 k/uL    RBC 4.89 3.95 - 5.11 m/uL    Hemoglobin 15.8 (H) 11.9 - 15.1 g/dL    Hematocrit 49.0 (H) 36.3 - 47.1 %    .2 82.6 - 102.9 fL    MCH 32.3 25.2 - 33.5 pg    MCHC 32.2 28.4 - 34.8 g/dL    RDW 13.9 11.8 - 14.4 %    Platelets See Reflexed IPF Result 138 - 453 k/uL    MPV NOT REPORTED 8.1 - 13.5 fL    NRBC Automated 0.0 0.0 per 100 WBC   BASIC METABOLIC PANEL   Result Value Ref Range    Glucose 141 (H) 70 - 99 mg/dL    BUN 13 8 - 23 mg/dL    CREATININE 0.64 0.50 - 0.90 mg/dL    Bun/Cre Ratio NOT REPORTED 9 - 20    Calcium 9.5 8.6 - 10.4 mg/dL    Sodium 139 135 - 144 mmol/L    Potassium 3.1 (L) 3.7 - 5.3 mmol/L    Chloride 99 98 - 107 mmol/L    CO2 24 20 - 31 mmol/L    Anion Gap 16 9 - 17 mmol/L    GFR Non-African American >60 >60 mL/min    GFR African American >60 >60 mL/min    GFR Comment          GFR Staging NOT REPORTED    Troponin   Result Value Ref Range    Troponin, High Sensitivity 24 (H) 0 - 14 ng/L    Troponin T NOT REPORTED <0.03 ng/mL    Troponin Interp NOT REPORTED    Brain Natriuretic Peptide   Result Value Ref Range    Pro-BNP 6,816 (H) <300 pg/mL    BNP Interpretation Pro-BNP Reference Range:    MAGNESIUM   Result Value Ref Range    Magnesium 2.2 1.6 - 2.6 mg/dL   TSH with Reflex   Result Value Ref Range    TSH 0.11 (L) 0.30 - 5.00 mIU/L   ELECTROLYTES PLUS   Result Value Ref Range    POC Sodium 142 138 - 146 mmol/L    POC Potassium 3.0 (L) 3.5 - 4.5 mmol/L    POC Chloride 98 98 - 107 mmol/L    POC TCO2 27 22 - 30 mmol/L    Anion Gap 18 (H) 7 - 16 mmol/L   Hemoglobin and hematocrit, blood   Result Value Ref Range    POC Hemoglobin 17.8 (H) 12.0 - 16.0 g/dL    POC Hematocrit 52 (H) 36 - 46 % CALCIUM, IONIC (POC)   Result Value Ref Range    POC Ionized Calcium 1.12 (L) 1.15 - 1.33 mmol/L   Immature Platelet Fraction   Result Value Ref Range    Platelet, Immature Fraction 15.2 (H) 1.1 - 10.3 %    Platelet, Fluorescence 154 138 - 453 k/uL   T4, Free   Result Value Ref Range    Thyroxine, Free 3.08 (H) 0.93 - 1.70 ng/dL   LACTIC ACID, WHOLE BLOOD   Result Value Ref Range    Lactic Acid, Whole Blood 2.1 0.7 - 2.1 mmol/L   Venous Blood Gas, POC   Result Value Ref Range    pH, Jesus Manuel 7.511 (H) 7.320 - 7.430    pCO2, Jesus Manuel 33.7 (L) 41.0 - 51.0 mm Hg    pO2, Jesus Manuel 21.4 (L) 30 - 50 mm Hg    HCO3, Venous 27.0 22.0 - 29.0 mmol/L    Total CO2, Venous NOT REPORTED 23.0 - 30.0 mmol/L    Negative Base Excess, Jesus Manuel NOT REPORTED 0.0 - 2.0    Positive Base Excess, Jesus Manuel 4 (H) 0.0 - 3.0    O2 Sat, Jesus Manuel 43 (L) 60.0 - 85.0 %    O2 Device/Flow/% NOT REPORTED     John Test NOT REPORTED     Sample Site NOT REPORTED     Mode NOT REPORTED     FIO2 NOT REPORTED     Pt Temp NOT REPORTED     POC pH Temp NOT REPORTED     POC pCO2 Temp NOT REPORTED mm Hg    POC pO2 Temp NOT REPORTED mm Hg   Creatinine W/GFR Point of Care   Result Value Ref Range    POC Creatinine 0.65 0.51 - 1.19 mg/dL    GFR Comment >60 >60 mL/min    GFR Non-African American >60 >60 mL/min    GFR Comment         POCT urea (BUN)   Result Value Ref Range    POC BUN 14 8 - 26 mg/dL   Lactic Acid, POC   Result Value Ref Range    POC Lactic Acid 3.58 (H) 0.56 - 1.39 mmol/L   POCT Glucose   Result Value Ref Range    POC Glucose 146 (H) 74 - 100 mg/dL       IMPRESSION: VT, braqdycardia    RADIOLOGY:  XR CHEST PORTABLE   Final Result   Bilateral pleural effusions with bibasilar opacities that could represent   atelectasis or infection             EKG:  EKG showing bradycardic bigeminy, poor R wave progression is noted, Q waves in the septal leads with septal infarct of age undetermined, no ST elevations noted.     POC ULTRASOUND:      EMERGENCY DEPARTMENT COURSE:  11:44 AM EDT  Patient getting meds, and o2 sat low and on o2 now, bnp elevated,   Spoke with cardiology fellow who will be down to speak with patient and evaluate . Patient continues to have episodes of NSVT. Pressure remains >596 systolic    96:14 PM EDT  Patients BP low at this time, and cardiology fellow at bedside. 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 Patient admitted to medicine to go to cardiac ICU    1:28 PM EDT  Patient Taken for temp pacemaker. PROCEDURES:      CONSULTS:  IP CONSULT TO CARDIOLOGY  IP CONSULT TO CRITICAL CARE  IP CONSULT TO INTERNAL MEDICINE  IP CONSULT TO CASE MANAGEMENT    CRITICAL CARE:  45 minutes    FINAL IMPRESSION      1. Syncope and collapse    2. Bradycardia    3. Ventricular tachycardia ([address]          DISPOSITION / PLAN     DISPOSITION        PATIENT REFERRED TO:  No follow-up provider specified.     DISCHARGE MEDICATIONS:  New Prescriptions    No medications on file       Deni Lou DO  1:27 PM    Attending Emergency Physician  [facility] ED    (Please note that portions of this note were completed with a voice recognition program.  Effortswere made to edit the dictations but occasionally words are mis-transcribed.)             Brenden Jones, DO  09/22/21 3793

## 2021-09-22 NOTE — PROGRESS NOTES
Pharmacist Note for Home Medication Reconciliation     Pharmacist reviewed home medications with patient and home med list was updated, some discrepancies was discovered please note the following:    Medications added: none      Medications not taking per patient:  Lexapro       Medication changes in doses or instructions:   Metoprolol succinate 50mg - 2 tablets by mouth daily      Recommendations: Metoprolol succinate is typically dosed one tablet once a day. Would recommend to change home medication to metoprolol succinate 100mg 1 tablet daily.      Thank you,   Fer Webb, PharmD 9/22/2021 5:53 PM

## 2021-09-22 NOTE — PLAN OF CARE
Problem: Skin Integrity:  Goal: Will show no infection signs and symptoms  Description: Will show no infection signs and symptoms  9/22/2021 1937 by Corie Tracy RN  Outcome: Ongoing  9/22/2021 1553 by Clearance PATRICK Allred  Outcome: Ongoing  Goal: Absence of new skin breakdown  Description: Absence of new skin breakdown  9/22/2021 1937 by Corie Tracy RN  Outcome: Ongoing  9/22/2021 1553 by Clearance PATRICK Allred  Outcome: Ongoing     Problem: Falls - Risk of:  Goal: Will remain free from falls  Description: Will remain free from falls  9/22/2021 1937 by Corie Tracy RN  Outcome: Ongoing  9/22/2021 1553 by Clearance PATRICK Allred  Outcome: Ongoing  Goal: Absence of physical injury  Description: Absence of physical injury  9/22/2021 1937 by Corie Tracy RN  Outcome: Ongoing  9/22/2021 1553 by Prabhakar Allred RN  Outcome: Ongoing     Problem: Infection:  Goal: Will remain free from infection  Description: Will remain free from infection  9/22/2021 1937 by Corie Tracy RN  Outcome: Ongoing  9/22/2021 1553 by Prabhakar Allred RN  Outcome: Ongoing     Problem: Safety:  Goal: Free from accidental physical injury  Description: Free from accidental physical injury  9/22/2021 1937 by Corie Tracy RN  Outcome: Ongoing  9/22/2021 1553 by Prabhakar Allred RN  Outcome: Ongoing  Goal: Free from intentional harm  Description: Free from intentional harm  9/22/2021 1937 by Corie Tracy RN  Outcome: Ongoing  9/22/2021 1553 by Prabhakar Allred RN  Outcome: Ongoing     Problem: Daily Care:  Goal: Daily care needs are met  Description: Daily care needs are met  9/22/2021 1937 by Corie Tracy RN  Outcome: Ongoing  9/22/2021 1553 by Prabhakar Allred RN  Outcome: Ongoing     Problem: Pain:  Goal: Patient's pain/discomfort is manageable  Description: Patient's pain/discomfort is manageable  9/22/2021 1937 by Corie Tracy RN  Outcome: Ongoing  9/22/2021 1553 by Prabhakar Allred RN  Outcome: Ongoing     Problem: Skin Integrity:  Goal: Skin integrity will stabilize  Description: Skin integrity will stabilize  9/22/2021 1937 by Janell Weinstein RN  Outcome: Ongoing  9/22/2021 1553 by Litzy Dacosta RN  Outcome: Ongoing     Problem: Discharge Planning:  Goal: Patients continuum of care needs are met  Description: Patients continuum of care needs are met  9/22/2021 1937 by Janell Weinstein RN  Outcome: Ongoing  9/22/2021 1553 by Litzy Dacosta RN  Outcome: Ongoing

## 2021-09-22 NOTE — PROGRESS NOTES
Pt from cath lab. Perfect serve placed to internal med senior on-call to update that pt temp pacer placed and cath completed with no intervention needed. Writer attempted to clarify orders for NPO, K was 3.1 at 10am & only 10mEq given in cath lab as well as no PRN orders for replacement. Per internal med senior on call to be placing orders.

## 2021-09-22 NOTE — ED NOTES
Bed: 26  Expected date:   Expected time:   Means of arrival:   Comments:  [address], RN  09/22/21 1027

## 2021-09-22 NOTE — OP NOTE
[facility]    CARDIAC CATHETERIZATION    Date:   9/22/2021  Patient name:  Ector Witt  Date of admission:  9/22/2021 10:22 AM  MRN:   [id number]  YOB: 1935    Operators:  Primary:   Unknown MD Nurys         Procedure performed:       [x] Left Heart Catheterization. [] Graft Angiography. [x] Left Ventriculography. [] Right Heart Catheterization. [] Coronary Angiography. [] Aortic Valve Studies. [] PCI:      [] Other:       Pre Procedure Conscious Sedation Data:  ASA Class:    [] I [] II [x] III [] IV    Mallampati Class:  [x] I [] II [] III [] IV      Indication:  [] STEMI      [] + Stress test  [] ACS      [x] + EKG Changes  [] Non Q MI       [x] Significant Risk Factors  [] Recurrent Angina             [] Diabetes Mellitus    [] New LBBB      [] Uncontrolled HTN. [] CHF / Low EF changes     [] Abnormal CTA / Ca Score      Procedure:  Access:  [x] Femoral  [] Radial  artery       [] Right  [] Left    Procedure: After informed consent was obtained with explanation of the risks and benefits, patient was brought to the cath lab. The access area was prepped and draped in sterile fashion. 1% lidocaine was used for local block. The artery was cannulated with 6  Fr sheath with brisk arterial blood return. The side port was frequently flushed and aspirated with normal saline. Findings:    Angiographic Findings        Cardiac Arteries and Lesion Findings       LMCA: Mild irregularities 10-20%. LAD: Mild irregularities 20-30%. LCx: Mild irregularities 10-20%. RCA: Mild irregularities 10-20%. Coronary Tree        Dominance: Right       Procedure Summary        Mild CAD. Recommendations        Medical therapy for CAD. Temporary pace maker wire inserted for severe bradycardia. Discussed with family before and after the procedure is done. EP consult. Replace electrolytes.          Estimated Blood Loss: 5 mL      ____________________________________________________________________    History and Risk Factors    [x] Hypertension     [] Family history of CAD  [x] Hyperlipidemia     [] Cerebrovascular Disease   [] Prior MI       [] Peripheral Vascular disease   [] Prior PCI              [] Diabetes Mellitus    [] Left Main PCI. [] Currently on Dialysis. [] Prior CABG. [] Currently smoker. [] Cardiac Arrest outside of healthcare facility. [] Yes    [] No        Witnessed     [] Yes   [] No     Arrest after arrival of EMS  [] Yes   [] No     [] Cardiac Arrest at other Facility. [] Yes   [] No    Pre-Procedure Information. Heart Failure       [] Yes    [x] No        Class  [] I      [] II  [] III    [] IV. New Diagnosis    [] Yes  [] No    HF Type      [] Systolic   [] Diastolic          [] Unknown. Diagnostic Test:   EKG       [] Normal   [x] Abnormal    New antiarrhythmia medications:    [] Yes   [] No   New onset atrial fibrillation / Flutter     [] Yes   [] No   ECG Abnormalities:      [] V. Fib   [x] Brenda V. Tach           [] NS V. T   [] New LBBB           [] T. Inv  []  ST dev > 0.5 mm         [] PVC's freq  [] PVC's infrequent    Stress Test Performed:      [] Yes    [x] No     Type:     [] Stress Echo   [] Exercise Stress Test (no imaging)      [] Stress Nuclear  [] Stress Imaging     Results   [] Negative   [] Positive        [] Indeterminate  [] Unavailable     If Positive/ Risk / Extent of Ischemia:       [] Low  [] Intermediate         [] High  [] Unavailable      Cardiac CTA Performed:     [] Yes    [x] No      Results   [] CAD   [] Non obstructive CAD      [] No CAD   [] Uncertain      [] Unknown   [] Structural Disease.      Pre Procedure Medications:   [] Yes    [x] No         [] ASA   [] Beta Blockers      [] Nitrate   [] Ca Channel Blockers      [] Ranolazine   [] Statin       [] Plavix/Others antiplatelets      Electronically signed on 9/22/2021 at 2:00 PM by:    Derrell Phalen, MD  Fellow, [address] [facility] ALBERTO Holman, Attending Physician  [facility].

## 2021-09-22 NOTE — ED NOTES
Patient having four to eight beat runs of vtach. She is placed on 2L then increased to 4L O2 via NC for spO2 of 90% on RA.      Antwan Lamb RN  09/22/21 6541

## 2021-09-22 NOTE — PROCEDURES
Date:   9/22/2021  Patient name: Que Nation  Date of admission:  9/22/2021 10:22 AM  MRN:   [id number]  YOB: 1935    Temporary Pacemaker Placement     Operators:  Primary: Dr. Kathya uJlien       Indications for temporary pacemaker placement: Complete heart block     Procedure performed: Temporary Pacemaker Placement     Access: Right IJ Vein    Procedure:  After informed consent was obtained with explanation of the risks and benefits, the Right IJ was prepped and draped in sterile fashion. 1% lidocaine was used for local block. A 5 Fr catheter was inserted under ultrasound guidance, a  bipolar pacing catheter was then  advanced into the Cordis. The catheter was advanced to approximately 15  centimeters whereupon the balloon was inflated. It was further advanced into the right atrium and then the right ventricle to a depth of cm at which point pacing was achieved. The balloon was deflated. Capture was reachieved at 10 mAmp. The patient tolerated the procedure well. The pacing threshold was set at 60 bpm.      A chest xray was ordered at the time of procedure completion. Complications: None  Estimated blood loss less than 5 ml     Barbra Zhao MD, MD  Fellow, Reece Julien, Attending Physician  [facility].

## 2021-09-22 NOTE — ED NOTES
The following labs were labeled with patient stickers & tubed to lab;    [x]Lavender   []On Ice  [x]Blue  [x]Green/ Yellow  []Green/ Black []On Ice  []Pink  []Red  []Yellow    []COVID-19 Swab []Rapid    []Urine Sample  []Pelvic Cultures    []Blood Cultures       Al Vieyra RN  09/22/21 6655

## 2021-09-22 NOTE — PROGRESS NOTES
Patient's daughter updated about need for LHC. Need cardiac cath for risk stratification. Risk and benefits of cardiac catheterization were discussed in detail. Risk of bleeding, requiring blood transfusion, vascular complication requiring surgery, renal insufficieny with need of dialysis, CVA, MI, death and anesthesia complications including intubation were discussed. Daughter agrees to proceed and verbalizes understanding.       Ally Amaya   CV fellow

## 2021-09-23 ENCOUNTER — APPOINTMENT (OUTPATIENT)
Dept: CT IMAGING | Age: 86
DRG: 242 | End: 2021-09-23
Payer: MEDICARE

## 2021-09-23 ENCOUNTER — APPOINTMENT (OUTPATIENT)
Dept: GENERAL RADIOLOGY | Age: 86
DRG: 242 | End: 2021-09-23
Payer: MEDICARE

## 2021-09-23 ENCOUNTER — APPOINTMENT (OUTPATIENT)
Dept: CARDIAC CATH/INVASIVE PROCEDURES | Age: 86
DRG: 242 | End: 2021-09-23
Payer: MEDICARE

## 2021-09-23 LAB
ABSOLUTE EOS #: <0.03 K/UL (ref 0–0.44)
ABSOLUTE IMMATURE GRANULOCYTE: 0.04 K/UL (ref 0–0.3)
ABSOLUTE LYMPH #: 1.08 K/UL (ref 1.1–3.7)
ABSOLUTE MONO #: 1.17 K/UL (ref 0.1–1.2)
ANION GAP SERPL CALCULATED.3IONS-SCNC: 14 MMOL/L (ref 9–17)
ANION GAP SERPL CALCULATED.3IONS-SCNC: 15 MMOL/L (ref 9–17)
BASOPHILS # BLD: 1 % (ref 0–2)
BASOPHILS ABSOLUTE: 0.05 K/UL (ref 0–0.2)
BUN BLDV-MCNC: 14 MG/DL (ref 8–23)
BUN BLDV-MCNC: 14 MG/DL (ref 8–23)
BUN/CREAT BLD: ABNORMAL (ref 9–20)
BUN/CREAT BLD: ABNORMAL (ref 9–20)
CALCIUM SERPL-MCNC: 9.2 MG/DL (ref 8.6–10.4)
CALCIUM SERPL-MCNC: 9.3 MG/DL (ref 8.6–10.4)
CHLORIDE BLD-SCNC: 100 MMOL/L (ref 98–107)
CHLORIDE BLD-SCNC: 101 MMOL/L (ref 98–107)
CO2: 23 MMOL/L (ref 20–31)
CO2: 23 MMOL/L (ref 20–31)
CREAT SERPL-MCNC: 0.51 MG/DL (ref 0.5–0.9)
CREAT SERPL-MCNC: 0.51 MG/DL (ref 0.5–0.9)
DIFFERENTIAL TYPE: ABNORMAL
EKG ATRIAL RATE: 38 BPM
EKG ATRIAL RATE: 56 BPM
EKG P AXIS: 24 DEGREES
EKG P-R INTERVAL: 174 MS
EKG Q-T INTERVAL: 478 MS
EKG Q-T INTERVAL: 662 MS
EKG QRS DURATION: 94 MS
EKG QRS DURATION: 96 MS
EKG QTC CALCULATION (BAZETT): 444 MS
EKG QTC CALCULATION (BAZETT): 638 MS
EKG R AXIS: 20 DEGREES
EKG R AXIS: 77 DEGREES
EKG T AXIS: 104 DEGREES
EKG T AXIS: 89 DEGREES
EKG VENTRICULAR RATE: 52 BPM
EKG VENTRICULAR RATE: 56 BPM
EOSINOPHILS RELATIVE PERCENT: 0 % (ref 1–4)
GFR AFRICAN AMERICAN: >60 ML/MIN
GFR AFRICAN AMERICAN: >60 ML/MIN
GFR NON-AFRICAN AMERICAN: >60 ML/MIN
GFR NON-AFRICAN AMERICAN: >60 ML/MIN
GFR SERPL CREATININE-BSD FRML MDRD: ABNORMAL ML/MIN/{1.73_M2}
GLUCOSE BLD-MCNC: 136 MG/DL (ref 70–99)
GLUCOSE BLD-MCNC: 139 MG/DL (ref 70–99)
HCT VFR BLD CALC: 49.3 % (ref 36.3–47.1)
HCT VFR BLD CALC: 49.3 % (ref 36.3–47.1)
HEMOGLOBIN: 15.5 G/DL (ref 11.9–15.1)
HEMOGLOBIN: 15.6 G/DL (ref 11.9–15.1)
IMMATURE GRANULOCYTES: 0 %
LYMPHOCYTES # BLD: 10 % (ref 24–43)
MAGNESIUM: 2.2 MG/DL (ref 1.6–2.6)
MCH RBC QN AUTO: 31.8 PG (ref 25.2–33.5)
MCH RBC QN AUTO: 31.8 PG (ref 25.2–33.5)
MCHC RBC AUTO-ENTMCNC: 31.4 G/DL (ref 28.4–34.8)
MCHC RBC AUTO-ENTMCNC: 31.6 G/DL (ref 28.4–34.8)
MCV RBC AUTO: 100.6 FL (ref 82.6–102.9)
MCV RBC AUTO: 101 FL (ref 82.6–102.9)
MONOCYTES # BLD: 11 % (ref 3–12)
NRBC AUTOMATED: 0 PER 100 WBC
NRBC AUTOMATED: 0 PER 100 WBC
PARTIAL THROMBOPLASTIN TIME: 26.1 SEC (ref 20.5–30.5)
PDW BLD-RTO: 14.1 % (ref 11.8–14.4)
PDW BLD-RTO: 14.2 % (ref 11.8–14.4)
PLATELET # BLD: ABNORMAL K/UL (ref 138–453)
PLATELET # BLD: ABNORMAL K/UL (ref 138–453)
PLATELET ESTIMATE: ABNORMAL
PLATELET, FLUORESCENCE: 154 K/UL (ref 138–453)
PLATELET, FLUORESCENCE: 161 K/UL (ref 138–453)
PLATELET, IMMATURE FRACTION: 14.3 % (ref 1.1–10.3)
PLATELET, IMMATURE FRACTION: 15.2 % (ref 1.1–10.3)
PMV BLD AUTO: ABNORMAL FL (ref 8.1–13.5)
PMV BLD AUTO: ABNORMAL FL (ref 8.1–13.5)
POTASSIUM SERPL-SCNC: 4.1 MMOL/L (ref 3.7–5.3)
POTASSIUM SERPL-SCNC: 4.1 MMOL/L (ref 3.7–5.3)
RBC # BLD: 4.88 M/UL (ref 3.95–5.11)
RBC # BLD: 4.9 M/UL (ref 3.95–5.11)
RBC # BLD: ABNORMAL 10*6/UL
SEG NEUTROPHILS: 77 % (ref 36–65)
SEGMENTED NEUTROPHILS ABSOLUTE COUNT: 8.05 K/UL (ref 1.5–8.1)
SODIUM BLD-SCNC: 138 MMOL/L (ref 135–144)
SODIUM BLD-SCNC: 138 MMOL/L (ref 135–144)
WBC # BLD: 10.4 K/UL (ref 3.5–11.3)
WBC # BLD: 10.9 K/UL (ref 3.5–11.3)
WBC # BLD: ABNORMAL 10*3/UL

## 2021-09-23 PROCEDURE — 2709999900 HC NON-CHARGEABLE SUPPLY

## 2021-09-23 PROCEDURE — 93010 ELECTROCARDIOGRAM REPORT: CPT | Performed by: INTERNAL MEDICINE

## 2021-09-23 PROCEDURE — C1898 LEAD, PMKR, OTHER THAN TRANS: HCPCS

## 2021-09-23 PROCEDURE — C1785 PMKR, DUAL, RATE-RESP: HCPCS

## 2021-09-23 PROCEDURE — 2000000000 HC ICU R&B

## 2021-09-23 PROCEDURE — 71045 X-RAY EXAM CHEST 1 VIEW: CPT

## 2021-09-23 PROCEDURE — 33208 INSRT HEART PM ATRIAL & VENT: CPT | Performed by: INTERNAL MEDICINE

## 2021-09-23 PROCEDURE — 02HK3JZ INSERTION OF PACEMAKER LEAD INTO RIGHT VENTRICLE, PERCUTANEOUS APPROACH: ICD-10-PCS | Performed by: INTERNAL MEDICINE

## 2021-09-23 PROCEDURE — 2580000003 HC RX 258: Performed by: STUDENT IN AN ORGANIZED HEALTH CARE EDUCATION/TRAINING PROGRAM

## 2021-09-23 PROCEDURE — 83735 ASSAY OF MAGNESIUM: CPT

## 2021-09-23 PROCEDURE — 85027 COMPLETE CBC AUTOMATED: CPT

## 2021-09-23 PROCEDURE — 85025 COMPLETE CBC W/AUTO DIFF WBC: CPT

## 2021-09-23 PROCEDURE — 6370000000 HC RX 637 (ALT 250 FOR IP): Performed by: STUDENT IN AN ORGANIZED HEALTH CARE EDUCATION/TRAINING PROGRAM

## 2021-09-23 PROCEDURE — 6360000002 HC RX W HCPCS

## 2021-09-23 PROCEDURE — C1894 INTRO/SHEATH, NON-LASER: HCPCS

## 2021-09-23 PROCEDURE — 80048 BASIC METABOLIC PNL TOTAL CA: CPT

## 2021-09-23 PROCEDURE — 6360000002 HC RX W HCPCS: Performed by: STUDENT IN AN ORGANIZED HEALTH CARE EDUCATION/TRAINING PROGRAM

## 2021-09-23 PROCEDURE — B3121ZZ FLUOROSCOPY OF LEFT SUBCLAVIAN ARTERY USING LOW OSMOLAR CONTRAST: ICD-10-PCS | Performed by: INTERNAL MEDICINE

## 2021-09-23 PROCEDURE — 02H63JZ INSERTION OF PACEMAKER LEAD INTO RIGHT ATRIUM, PERCUTANEOUS APPROACH: ICD-10-PCS | Performed by: INTERNAL MEDICINE

## 2021-09-23 PROCEDURE — 2500000003 HC RX 250 WO HCPCS: Performed by: STUDENT IN AN ORGANIZED HEALTH CARE EDUCATION/TRAINING PROGRAM

## 2021-09-23 PROCEDURE — 0JH606Z INSERTION OF PACEMAKER, DUAL CHAMBER INTO CHEST SUBCUTANEOUS TISSUE AND FASCIA, OPEN APPROACH: ICD-10-PCS | Performed by: INTERNAL MEDICINE

## 2021-09-23 PROCEDURE — 36415 COLL VENOUS BLD VENIPUNCTURE: CPT

## 2021-09-23 PROCEDURE — 2060000000 HC ICU INTERMEDIATE R&B

## 2021-09-23 PROCEDURE — 2500000003 HC RX 250 WO HCPCS

## 2021-09-23 PROCEDURE — 85055 RETICULATED PLATELET ASSAY: CPT

## 2021-09-23 PROCEDURE — 70450 CT HEAD/BRAIN W/O DYE: CPT

## 2021-09-23 PROCEDURE — 99233 SBSQ HOSP IP/OBS HIGH 50: CPT | Performed by: INTERNAL MEDICINE

## 2021-09-23 PROCEDURE — 85730 THROMBOPLASTIN TIME PARTIAL: CPT

## 2021-09-23 RX ORDER — HEPARIN SODIUM 1000 [USP'U]/ML
4000 INJECTION, SOLUTION INTRAVENOUS; SUBCUTANEOUS ONCE
Status: DISCONTINUED | OUTPATIENT
Start: 2021-09-23 | End: 2021-09-24

## 2021-09-23 RX ORDER — HEPARIN SODIUM 1000 [USP'U]/ML
4000 INJECTION, SOLUTION INTRAVENOUS; SUBCUTANEOUS PRN
Status: DISCONTINUED | OUTPATIENT
Start: 2021-09-23 | End: 2021-09-23

## 2021-09-23 RX ORDER — AMLODIPINE BESYLATE 10 MG/1
10 TABLET ORAL DAILY
Status: DISCONTINUED | OUTPATIENT
Start: 2021-09-23 | End: 2021-09-24

## 2021-09-23 RX ORDER — VANCOMYCIN HYDROCHLORIDE 1 G/200ML
1000 INJECTION, SOLUTION INTRAVENOUS ONCE
Status: DISCONTINUED | OUTPATIENT
Start: 2021-09-23 | End: 2021-09-26

## 2021-09-23 RX ORDER — HEPARIN SODIUM 1000 [USP'U]/ML
2000 INJECTION, SOLUTION INTRAVENOUS; SUBCUTANEOUS PRN
Status: DISCONTINUED | OUTPATIENT
Start: 2021-09-23 | End: 2021-09-23

## 2021-09-23 RX ORDER — HEPARIN SODIUM 10000 [USP'U]/100ML
5-30 INJECTION, SOLUTION INTRAVENOUS CONTINUOUS
Status: DISCONTINUED | OUTPATIENT
Start: 2021-09-23 | End: 2021-09-23

## 2021-09-23 RX ADMIN — FAMOTIDINE 20 MG: 10 INJECTION INTRAVENOUS at 09:44

## 2021-09-23 RX ADMIN — ACETAMINOPHEN 650 MG: 325 TABLET ORAL at 23:44

## 2021-09-23 RX ADMIN — ONDANSETRON 4 MG: 2 INJECTION INTRAMUSCULAR; INTRAVENOUS at 23:57

## 2021-09-23 RX ADMIN — ONDANSETRON 4 MG: 2 INJECTION INTRAMUSCULAR; INTRAVENOUS at 04:36

## 2021-09-23 RX ADMIN — SODIUM CHLORIDE, PRESERVATIVE FREE 20 ML: 5 INJECTION INTRAVENOUS at 19:59

## 2021-09-23 RX ADMIN — FAMOTIDINE 20 MG: 10 INJECTION INTRAVENOUS at 19:59

## 2021-09-23 RX ADMIN — SODIUM CHLORIDE, PRESERVATIVE FREE 10 ML: 5 INJECTION INTRAVENOUS at 09:44

## 2021-09-23 ASSESSMENT — PAIN SCALES - GENERAL: PAINLEVEL_OUTOF10: 6

## 2021-09-23 ASSESSMENT — ENCOUNTER SYMPTOMS: TACHYPNEA: 1

## 2021-09-23 NOTE — CARE COORDINATION
Case Management Initial Discharge Plan  Sammie Burgos,             Met with:patient to discuss discharge plans. Information verified: address, contacts, phone number, , insurance Yes  Insurance Provider: Medicare and [employer]    Emergency Contact/Next of Kin name & number: Corrinne Richard [phone], Rand Lay [phone]  Who are involved in patient's support system? dtrs    PCP: Joseph Gomes MD  Date of last visit: 2 months ago      Discharge Planning    Living Arrangements:  [address] has 1 stories  2 stairs to climb to get into front door, 0 stairs to climb to reach second floor  Location of bedroom/bathroom in home main level    Patient able to perform ADL's:Independent    Current Services (outpatient & in home)    DME equipment: walker  DME provider:      Is patient receiving oral anticoagulation therapy? Yes, eliquis    If indicated:   Physician managing anticoagulation treatment: Dr Nikki Florian  Where does patient obtain lab work for ATC treatment? Potential Assistance Needed:       Patient agreeable to home care: No  [place] of choice provided:  n/a    Prior SNF/Rehab Placement and Facility:    Agreeable to SNF/Rehab: No  [place] of choice provided: n/a     Evaluation: no    Expected Discharge date:       Patient expects to be discharged to: If home: is the family and/or caregiver wiling & able to provide support at home? Lives alone  Who will be providing this support? dtrs for support    Follow Up Appointment: Best Day/ Time:      Transportation provider: has a ride  Transportation arrangements needed for discharge: No     Readmission Risk              Risk of Unplanned Readmission:  9             Does patient have a readmission risk score greater than 14?: No  If yes, follow-up appointment must be made within 7 days of discharge.      Goals of Care:       Educated patient on transitional options, provided freedom of choice and are agreeable with plan      Discharge Plan: home, lives alone, dtrs for support, has a ride          Electronically signed by Merritt Bueno RN on 9/23/21 at 8:34 AM EDT

## 2021-09-23 NOTE — PROGRESS NOTES
[facility]  Internal Medicine Teaching Residency Program  Inpatient Daily Progress Note  ______________________________________________________________________________    Patient: Cherelle Rose  YOB: 1935   MIW:[id number]    Acct: [de-identified]     Room: [address] [place] [address]  Admit date: 9/22/2021  Today's date: 09/23/21  Number of days in the hospital: 1    SUBJECTIVE   Admitting Diagnosis: Ventricular tachycardia ([address]     CC: Syncopal episode    Pt examined at bedside. Chart & results reviewed. No acute events overnight. She remained afebrile and hemodynamically stable, on room air, saturating  >94%. /69, HR 60, RR 20, Temp 97.8  - Received 1 dose of lasix 20 mg IV for bilateral pleural effusions  - Diuresis well- Uout 2.2L/24 h  Labs reviewed: wnl K 4.1; Mag pending; Glucose: 136<--181<--141; Hgb 15.6, WBC 10.4  She denies dizziness, chest pain, shortness of breath, abdominal pain, nausea, vomiting, palpitations. ROS:  Constitutional:  negative for chills, fevers, sweats  Respiratory:  negative for cough, dyspnea on exertion, hemoptysis, shortness of breath, wheezing  Cardiovascular:  negative for chest pain, chest pressure/discomfort, lower extremity edema, palpitations  Gastrointestinal:  negative for abdominal pain, constipation, diarrhea, nausea, vomiting  Neurological:  negative for dizziness, headache    BRIEF HISTORY     The patient is a pleasant 81 y.o. female presents with a chief complaint of syncopal episode. The patient has PMH significant of hypertension. The patient reported that she has been feeling well since yesterday with decreased appetite, nausea and dry heaving. The patient's daughter called EMS this morning after she had a syncopal episode. She reported feeling dizzy after which she \"blacked out\" and fell and hit her head on the carpet.   The patient did not have any abnormal shaking or jerking movements or urinary or fecal incontinence. Per EMS, the patient was in sustained V. tach and she was given 150 mg of amiodarone after which V. tach was spontaneously resolved. Patient reported taking all her medications in the morning. Patient denies any cardiac history or following up with any cardiologist.     In the ED, the patient found to be bradycardic HR 48, RR 25, /52 SPO2 92% on room air. EKG showed bradycardia bigeminy no ST elevation was seen. Labs showed K 3.1, Trop 24, Pro-BNP 6816, WBC 12.9, Hgb 15.8, Plt 154. Patient was started on amiodarone drip and potassium 100 mEq, Mag 2g and calcium 2g was replaced. Cardiology was consulted and patient was having 4-8 beat runs of V. Tach in the ED. Subsequently, patient's blood pressure was dropped to 77/65. Cardiolgy stopped amiodarone and switched to Dopamine and also resumed Lidocaine. Patient was taken for emergent temporary pacemaker for possible bradycardic induced torsades and left heart cath. Patient was admitted to cardiac ICU for further management. OBJECTIVE     Vital Signs:  /69   Pulse 60   Temp 97.8 °F (36.6 °C) (Oral)   Resp 20   Ht 5' 5\" (1.651 m)   Wt 165 lb (74.8 kg)   SpO2 95%   BMI 27.46 kg/m²     Temp (24hrs), Av.5 °F (36.4 °C), Min:95.7 °F (35.4 °C), Max:98.7 °F (37.1 °C)    In: 488. 3   Out: 2200 [Urine:2200]    Physical Exam:  Constitutional: This is a well developed, well nourished, 25-29.9 - Overweight 81y.o. year old female who is alert, oriented, cooperative and in no apparent distress. Head:normocephalic and atraumatic. EENT:  PERRLA. No conjunctival injections. Septum was midline, mucosa was without erythema, exudates or cobblestoning. No thrush was noted. Neck: Supple without thyromegaly. No elevated JVP. Trachea was midline. Respiratory: Decreased breath sounds bilaterally  Cardiovascular: Regular without murmur, clicks, gallops or rubs. Abdomen: Slightly rounded and soft without organomegaly.  No rebound, rigidity or guarding was appreciated. Lymphatic: No lymphadenopathy. Musculoskeletal: Normal curvature of the spine. No gross muscle weakness. Extremities:  1+ pedal edema. Muscle size, tone and strength are normal.  No involuntary movements are noted. Skin:  Warm and dry. Good color, turgor and pigmentation. No lesions or scars. No cyanosis or clubbing  Neurological/Psychiatric: The patient's general behavior, level of consciousness, thought content and emotional status is normal.        Medications:  Scheduled Medications:    famotidine (PEPCID) injection  20 mg IntraVENous BID    sodium chloride flush  5-40 mL IntraVENous 2 times per day     Continuous Infusions:    norepinephrine      lidocaine 1 mg/min (09/22/21 1225)    DOPamine 4.5 mcg/kg/min (09/22/21 1259)    sodium chloride       PRN Medicationsondansetron, 4 mg, Q8H PRN   Or  ondansetron, 4 mg, Q6H PRN  polyethylene glycol, 17 g, Daily PRN  acetaminophen, 650 mg, Q6H PRN   Or  acetaminophen, 650 mg, Q6H PRN  sodium chloride flush, 5-40 mL, PRN  sodium chloride, 25 mL, PRN  hydrALAZINE, 10 mg, Q6H PRN        Diagnostic Labs:  CBC:   Recent Labs     09/22/21  1042 09/23/21  0442   WBC 12.9* 10.4   RBC 4.89 4.90   HGB 15.8* 15.6*   HCT 49.0* 49.3*   .2 100.6   RDW 13.9 14.1   PLT See Reflexed IPF Result See Reflexed IPF Result     BMP:   Recent Labs     09/22/21  1514 09/22/21  2025 09/23/21  0442    139 138   K 3.3* 3.7 4.1   CL 99 102 100   CO2 24 24 23   BUN 13 13 14   CREATININE 0.55 0.58 0.51     BNP: No results for input(s): BNP in the last 72 hours. PT/INR: No results for input(s): PROTIME, INR in the last 72 hours. APTT: No results for input(s): APTT in the last 72 hours. CARDIAC ENZYMES: No results for input(s): CKMB, CKMBINDEX, TROPONINI in the last 72 hours.     Invalid input(s): CKTOTAL;3  FASTING LIPID PANEL:No results found for: CHOL, HDL, TRIG  LIVER PROFILE:   Recent Labs     09/22/21  1042   AST 28   ALT 25   BILIDIR 0.65*   BILITOT 1.67*   ALKPHOS 106*      MICROBIOLOGY: No results found for: CULTURE    Imaging:    CT HEAD WO CONTRAST    Result Date: 9/23/2021  No acute intracranial abnormality. XR CHEST PORTABLE    Result Date: 9/22/2021  Right jugular transvenous pacer in place Findings suggest congestive heart failure     XR CHEST PORTABLE    Result Date: 9/22/2021  Bilateral pleural effusions with bibasilar opacities that could represent atelectasis or infection       ASSESSMENT & PLAN     ASSESSMENT / PLAN:     Principal Problem:    Ventricular tachycardia (HCC)  Active Problems:    Cardiac dysrhythmia    Syncope and collapse    Severe sinus bradycardia    Fall at home    Bilateral Pleural effusion    Pedal edema  Resolved Problems:    * No resolved hospital problems. *    V. Tach  - s/p emergent temporary placement due to severe bradycardia induced torsades and LHC today  - LHC: Mild CAD; medical therapy for CAD  - Echo 09/22: LVEF 35-40% and evidence of diastolic dysfunction  - Cardiology following, will follow up recommedations  - EP consulted, will follow-up     Severe sinus bradycardia- in paced rhythm now- HR 60  - Patient's HR in 30s-40s in ED; ECG showed sinus bradycardia  - s/p emergent temporary placement due to severe bradycardia induced torsades   - patient currently in paced rhythm HR 60s  - Continue Lidocaine gtt per cardiology  - EP consulted, will follow-up     Syncope   - likely due to bradycardia at home  - CT Head: no acute intracranial abnorm  - Echo 09/22: LVEF 35-40% and evidence of diastolic dysfunction  - Cardiology following, will follow up recommedations     Bilateral Pleural effusion  - CXR showed bilateral pleural effusions with bibasilar opacities that could represent atelectasis or infecction  - Echo 09/22: LVEF 35-40% and evidence of diastolic dysfunction  - Received 1 dose of lasix 20 mg IV  - Diuresis well- Uout 2.2L/24 h     Paroxysmal A.fib (on Eliquis 5 mg BID at home)  - Patient is in paced rhythm after pacemaker- HR 60 NSR  - Hold Amiodarone for now per Cardiology    DVT ppx : EPC Cuffs  GI ppx: Pepcid    PT/OT: On board  Discharge Planning / SW:  consulted, will follow up    Maximiliano Lovell MD  Internal Medicine Resident, PGY-1  [facility];  [place], [place]  9/23/2021, 6:10 AM

## 2021-09-23 NOTE — CONSULTS
[facility]   Progress Note                 Date:   9/23/2021  Patient name:  Yu Melara  Date of admission:  9/22/2021 10:22 AM  MRN:   [id number]  YOB: 1935  PCP:    Dolores Melendez MD    Reason for Admission: Syncope and collapse [R55]  Bradycardia [R00.1]  Ventricular tachycardia ([address] [I47.2]  Cardiac dysrhythmia [I49.9]      Subjective:       Clinical Changes / Abnormalities:     Patient seen and examined. S/p temporary pacemaker yesterday      I/O last 3 completed shifts: In: 488.3 [I.V.:266.9; IV Piggyback:221.4]  Out: 1800 [Urine:1800]  I/O this shift:  In: -   Out: 400 [Urine:400]          Medications:   Scheduled Meds:    famotidine (PEPCID) injection  20 mg IntraVENous BID    sodium chloride flush  5-40 mL IntraVENous 2 times per day     Continuous Infusions:    norepinephrine      lidocaine 1 mg/min (09/22/21 1225)    DOPamine 4.5 mcg/kg/min (09/22/21 1259)    sodium chloride       CBC:   Recent Labs     09/22/21  1042 09/23/21  0442   WBC 12.9* 10.4   HGB 15.8* 15.6*   PLT See Reflexed IPF Result See Reflexed IPF Result     BMP:    Recent Labs     09/22/21  1514 09/22/21 2025 09/23/21  0442    139 138   K 3.3* 3.7 4.1   CL 99 102 100   CO2 24 24 23   BUN 13 13 14   CREATININE 0.55 0.58 0.51   GLUCOSE 141* 181* 136*     Hepatic:   Recent Labs     09/22/21  1042   AST 28   ALT 25   BILITOT 1.67*   ALKPHOS 106*     Troponin:   Recent Labs     09/22/21  1042 09/22/21  1514   TROPHS 24* 24*     BNP: No results for input(s): BNP in the last 72 hours. Lipids: No results for input(s): CHOL, HDL in the last 72 hours. Invalid input(s): LDLCALCU  INR: No results for input(s): INR in the last 72 hours. Objective:   Vitals: /70   Pulse 60   Temp 97.8 °F (36.6 °C) (Oral)   Resp 23   Ht 5' 5\" (1.651 m)   Wt 165 lb 2 oz (74.9 kg)   SpO2 93%   BMI 27.48 kg/m²   General appearance: Alert. No acute distress.   HEENT: Head: Normal, normocephalic, atraumatic. Neck: no JVD, trachea midline  Lungs: Clear to auscultation bilaterally, no wheeze or crackles  Heart: Regular rate and rhythm, S1, S2 normal, no murmur  Abdomen: Soft, non-tender  Extremities: No edema  Neurologic: No focal neurologic deficits    STRESS:    ECHO:  Global left ventricular systolic function is moderately reduced. Estimated EF 35-40%. Calculated ejection fraction 37% by Estrada's method. Dyskinetic inferoseptal and anteroseptal walls (likely from paced rhythm)  Evidence of diastolic dysfunction. Normal right ventricular size and function. Left atrium is moderately dilated. Moderate eccentric mitral regurgitation. Moderate tricuspid regurgitation. Estimated right ventricular systolic pressure is 23GOOV. Small to moderate posterior pericardial effusion seen with no echo signs of  tamponade. Left pleural effusion. CATH:  Angiographic Findings      Cardiac Arteries and Lesion Findings     LMCA: Mild irregularities 10-20%.     LAD: Mild irregularities 20-30%.     LCx: Mild irregularities 10-20%.     RCA: Mild irregularities 10-20%.     Coronary Tree      Dominance: Right         Assessment:   1. Hx of paroxysmal Afib  2. Severe bradycardia  3. Bradycardia induced torsades s/p temporary pacemaker          Plan / Recommendations:   1. Monitor K and Mag and ensure K > 4 and Mag > 2. Please re-check BMP as K this morning was hemolysed  2. Resume heparin for hx of Afib  3. Will consult EP        Thank you for allowing us to participate in [address] [place] [address]. Will follow with you. Electronically signed on 09/23/21 at 6:39 AM by:    Zuleyka Ashraf MD, MD   Fellow, [address]    Attending Physician Statement  I have discussed the care of the patient, including pertinent history and exam findings, with the resident. I have seen and examined the patient and the key elements of all parts of the encounter have been performed by me.  I agree with the assessment, plan and orders as documented by the resident.     TPM in place, underlying rhythm 2:1 AV block  No further ectopy, will discontinue Lidocaine  Plan for PPM today  Will resume Eliquis after PPM placement     Pam Tejeda MD

## 2021-09-23 NOTE — FLOWSHEET NOTE
SPIRITUAL CARE PROGRESS NOTE     Spiritual Assessment: Patient was welcoming and receptive to a visit from [facility]. Patient engaged in conversation and shared struggles surrounding her current hospitalization. Patient expressed feeling filled with grief over her 's absence from her bedside and other personal sturggles. Patient's Mireya Bowels bipin is a source of support and comfort during this challenging time. Patient is well connected with family, especially her two daughters who are at bedside.  Intervention: I provided compassionate/active listening and validated patient's feelings, concerns, and experiences.  provided space for the patient to share important and significant life events.  inquired about patient's support network.  facilitated discussion related to bipin issues and offered a prayer. Patient welcomed a prayer and we prayed for [place]'s health and God's comfort in the midst of here affliction.  Outcome: Patient expressed gratitude for my visit. Patient cathartically expressed emotions and concerns. Patient is aware that [facility] is available 24\7 if she would like to receive another visit from a  or if there are any other spiritual services that our department could provide. 09/23/21 1621   Encounter Summary   Services provided to: Patient   Referral/Consult From: [facility] Family members   Continue Visiting   (9/23/21)   Complexity of Encounter Moderate   Length of Encounter 30 minutes   Spiritual Assessment Completed Yes   Routine   Type Initial   Assessment Calm;Tearful;Grieving;Coping   Intervention Active listening;Explored feelings, thoughts, concerns; Discussed belief system/[employer] practices/bipin;Discussed relationship with God   Outcome Expressed gratitude;Expressed feelings/needs/concerns; Less anxious, less agitated       Electronically signed by Chaplain Resident Cathleen Kim 9/23/2021 at 4:22 PM

## 2021-09-23 NOTE — PROCEDURES
[facility]             Procedure Note  Dual Chamber ICD   Dual Chamber Permanent Pacemaker      Today's Date:  9/23/2021  Patient name:  Juana Rowell  MRN:   [id number]  YOB: 1935  PCP:    Ketan Moreau MD      Procedure:  PPM     Indication: Complete heart block     Operators:  Primary: Dr. Rome Arriaga  (Attending)      :        Name Model Serial   Medtronic [id number]          Model # Serial #   RA-Lead 5294-17 [id number]   RV-Lead 8700-48 [id number]       ATRIUM R VENT   P waves 1.4 mV R waves: 11.9 mV   Threshold:  0.75V                 at  0.4 msec Threshold:  0.75V                at  0.4 msec   Impedance: 456 Ohms Impedance: 665 Ohms       Sedation monitoring  Left Subclavian Angiogram   RV lead placement  RA lead placement  Intra-operative  Lead Testing    pocket Creation  Generator Implant  Fluoro time: 5 min    Estimated Blood Loss:            [x] Minimal 10-25 cc   [] Minimal < 25 cc      [] Moderate 25-50 cc         [] >50 cc      Procedure  After the usual preparation of the left neck and chest, the patient was draped in the usual sterile manner. Local anesthesia was infused below the left clavicle from the midline laterally. An incision was made inferior and parallel to the clavicle. The incision was carried down to the fascia. A pocket was formed inferior using blunt dissection. A thin walled 18 gauge needle was used to puncture the left subclavian vein using the modified Seldinger technique. A guide wire was passed into the right heart under fluoroscopic control. This was repeated with a second guide wire. RV Lead Implant:  A 7 Persian sheath was then passed over the guide wire and the guide wire removed. The ventricular lead was advanced through the sheath into the right heart. The lead was then positioned in the right ventricle under fluoroscopic control.   The acute pacing and sensing thresholds were measured and found to be satisfactory. RA Lead Implant:  A second 7.0 Estonian sheath was then passed over the guide wire and the guide wire removed. The atrial lead was advanced into the right heart. The lead was then positioned in the right atrium. The acute pacing and sensing thresholds were measured and found to be acceptable. The two sheaths were removed and the leads were secured to the fascia. Generator: The implanted leads were attached to the device using the setscrews. The pocket was irrigated with antibiotic solution. The pulse generator and leads were coiled and placed in the pocket. Fluoroscopy was used to verify the final placement of the pacemaker and leads. The pocket was closed using multiple layers of suture and a dry sterile dressing was applied. There were no complications, patient tolerated the procedure well. The patient left the EP lab in stable condition. Impression / Device:    Successful Implantation of Dual chamber pacemaker      Plan:    Telemetry monitoring  Post-Device protocol  Interrogate pacemaker prior to discharge. CXR if needed  Incision and Device Check at [facility] in 7 days      Procedure:  After the usual preparation of the left neck and chest, the patient was draped in the usual sterile manner. Local anesthesia was infiltrated over the old incision. An incision was made over the old incision. The incision was carried down to the fascia, dissecting the leads and generator using the Plasma Blase in the previously made pocket. The old generator was removed, and the previously implanted leads were inspected and tested before it were attached to the new generator using the setscrews. The pocket was irrigated with antibiotic solution. The pulse generator and leads were coiled and placed in the pocket with  Tyrex patch. The pocket was closed using multiple layers of suture and a dry sterile dressing was applied.  There were no complications, patient tolerated the procedure well. The patient left the EP lab in stable condition. Impression / Device:  Successful implantation of dual chamber pacemaker. Electronically signed on 09/23/21 at 2:38 PM by:    Nadia Nunez MD, MD   Fellow, [address]    I have reviewed the case / procedure with resident / fellow  I have examined the patient personally  Patient agree with treatment plan as discussed before, final arrangement based on my evaluation and exam.    Risk and benefit of procedure planned were explained in details. Procedure was performed by me personally, with all aspect of the procedure being done using standard protocol. Note was modified based on my own assessment and treatment.     Cayla Womack MD  [facility]

## 2021-09-23 NOTE — PROGRESS NOTES
Cardiology fellow notified that potassium recheck came back at 3.7. No new orders for potassium replacement given.

## 2021-09-24 ENCOUNTER — APPOINTMENT (OUTPATIENT)
Dept: GENERAL RADIOLOGY | Age: 86
DRG: 242 | End: 2021-09-24
Payer: MEDICARE

## 2021-09-24 LAB
ABSOLUTE EOS #: <0.03 K/UL (ref 0–0.44)
ABSOLUTE IMMATURE GRANULOCYTE: 0.05 K/UL (ref 0–0.3)
ABSOLUTE LYMPH #: 0.71 K/UL (ref 1.1–3.7)
ABSOLUTE MONO #: 1.16 K/UL (ref 0.1–1.2)
ANION GAP SERPL CALCULATED.3IONS-SCNC: 10 MMOL/L (ref 9–17)
BASOPHILS # BLD: 0 % (ref 0–2)
BASOPHILS ABSOLUTE: 0.03 K/UL (ref 0–0.2)
BUN BLDV-MCNC: 16 MG/DL (ref 8–23)
BUN/CREAT BLD: ABNORMAL (ref 9–20)
CALCIUM SERPL-MCNC: 8.9 MG/DL (ref 8.6–10.4)
CHLORIDE BLD-SCNC: 101 MMOL/L (ref 98–107)
CO2: 26 MMOL/L (ref 20–31)
CREAT SERPL-MCNC: 0.5 MG/DL (ref 0.5–0.9)
DIFFERENTIAL TYPE: ABNORMAL
EOSINOPHILS RELATIVE PERCENT: 0 % (ref 1–4)
GFR AFRICAN AMERICAN: >60 ML/MIN
GFR NON-AFRICAN AMERICAN: >60 ML/MIN
GFR SERPL CREATININE-BSD FRML MDRD: ABNORMAL ML/MIN/{1.73_M2}
GFR SERPL CREATININE-BSD FRML MDRD: ABNORMAL ML/MIN/{1.73_M2}
GLUCOSE BLD-MCNC: 120 MG/DL (ref 70–99)
HCT VFR BLD CALC: 43.5 % (ref 36.3–47.1)
HEMOGLOBIN: 14 G/DL (ref 11.9–15.1)
IMMATURE GRANULOCYTES: 0 %
LYMPHOCYTES # BLD: 6 % (ref 24–43)
MCH RBC QN AUTO: 31.9 PG (ref 25.2–33.5)
MCHC RBC AUTO-ENTMCNC: 32.2 G/DL (ref 28.4–34.8)
MCV RBC AUTO: 99.1 FL (ref 82.6–102.9)
MONOCYTES # BLD: 10 % (ref 3–12)
NRBC AUTOMATED: 0 PER 100 WBC
PDW BLD-RTO: 14.1 % (ref 11.8–14.4)
PLATELET # BLD: ABNORMAL K/UL (ref 138–453)
PLATELET ESTIMATE: ABNORMAL
PLATELET, FLUORESCENCE: 137 K/UL (ref 138–453)
PLATELET, IMMATURE FRACTION: 15.7 % (ref 1.1–10.3)
PMV BLD AUTO: ABNORMAL FL (ref 8.1–13.5)
POTASSIUM SERPL-SCNC: 3.6 MMOL/L (ref 3.7–5.3)
RBC # BLD: 4.39 M/UL (ref 3.95–5.11)
RBC # BLD: ABNORMAL 10*6/UL
SEG NEUTROPHILS: 84 % (ref 36–65)
SEGMENTED NEUTROPHILS ABSOLUTE COUNT: 9.94 K/UL (ref 1.5–8.1)
SODIUM BLD-SCNC: 137 MMOL/L (ref 135–144)
WBC # BLD: 11.9 K/UL (ref 3.5–11.3)
WBC # BLD: ABNORMAL 10*3/UL

## 2021-09-24 PROCEDURE — 80048 BASIC METABOLIC PNL TOTAL CA: CPT

## 2021-09-24 PROCEDURE — 71045 X-RAY EXAM CHEST 1 VIEW: CPT

## 2021-09-24 PROCEDURE — 85055 RETICULATED PLATELET ASSAY: CPT

## 2021-09-24 PROCEDURE — 94761 N-INVAS EAR/PLS OXIMETRY MLT: CPT

## 2021-09-24 PROCEDURE — 6370000000 HC RX 637 (ALT 250 FOR IP): Performed by: STUDENT IN AN ORGANIZED HEALTH CARE EDUCATION/TRAINING PROGRAM

## 2021-09-24 PROCEDURE — 2060000000 HC ICU INTERMEDIATE R&B

## 2021-09-24 PROCEDURE — 94660 CPAP INITIATION&MGMT: CPT

## 2021-09-24 PROCEDURE — 85025 COMPLETE CBC W/AUTO DIFF WBC: CPT

## 2021-09-24 PROCEDURE — 99232 SBSQ HOSP IP/OBS MODERATE 35: CPT | Performed by: INTERNAL MEDICINE

## 2021-09-24 PROCEDURE — 2700000000 HC OXYGEN THERAPY PER DAY

## 2021-09-24 PROCEDURE — 36415 COLL VENOUS BLD VENIPUNCTURE: CPT

## 2021-09-24 PROCEDURE — 6360000002 HC RX W HCPCS

## 2021-09-24 PROCEDURE — 2580000003 HC RX 258: Performed by: STUDENT IN AN ORGANIZED HEALTH CARE EDUCATION/TRAINING PROGRAM

## 2021-09-24 PROCEDURE — 2500000003 HC RX 250 WO HCPCS: Performed by: STUDENT IN AN ORGANIZED HEALTH CARE EDUCATION/TRAINING PROGRAM

## 2021-09-24 RX ORDER — LOSARTAN POTASSIUM 50 MG/1
50 TABLET ORAL DAILY
Status: DISCONTINUED | OUTPATIENT
Start: 2021-09-24 | End: 2021-09-27 | Stop reason: HOSPADM

## 2021-09-24 RX ORDER — HYDROCHLOROTHIAZIDE 25 MG/1
12.5 TABLET ORAL DAILY
Status: DISCONTINUED | OUTPATIENT
Start: 2021-09-24 | End: 2021-09-27 | Stop reason: HOSPADM

## 2021-09-24 RX ORDER — POTASSIUM CHLORIDE 20 MEQ/1
40 TABLET, EXTENDED RELEASE ORAL ONCE
Status: COMPLETED | OUTPATIENT
Start: 2021-09-24 | End: 2021-09-24

## 2021-09-24 RX ORDER — LOSARTAN POTASSIUM AND HYDROCHLOROTHIAZIDE 12.5; 5 MG/1; MG/1
1 TABLET ORAL DAILY
Status: DISCONTINUED | OUTPATIENT
Start: 2021-09-24 | End: 2021-09-24

## 2021-09-24 RX ORDER — FUROSEMIDE 10 MG/ML
20 INJECTION INTRAMUSCULAR; INTRAVENOUS ONCE
Status: COMPLETED | OUTPATIENT
Start: 2021-09-24 | End: 2021-09-24

## 2021-09-24 RX ADMIN — LOSARTAN POTASSIUM 50 MG: 50 TABLET, FILM COATED ORAL at 12:17

## 2021-09-24 RX ADMIN — HYDROCHLOROTHIAZIDE 12.5 MG: 25 TABLET ORAL at 12:15

## 2021-09-24 RX ADMIN — FAMOTIDINE 20 MG: 10 INJECTION INTRAVENOUS at 20:02

## 2021-09-24 RX ADMIN — APIXABAN 5 MG: 5 TABLET, FILM COATED ORAL at 20:02

## 2021-09-24 RX ADMIN — POTASSIUM CHLORIDE 40 MEQ: 1500 TABLET, EXTENDED RELEASE ORAL at 05:59

## 2021-09-24 RX ADMIN — APIXABAN 5 MG: 5 TABLET, FILM COATED ORAL at 12:15

## 2021-09-24 RX ADMIN — SODIUM CHLORIDE, PRESERVATIVE FREE 10 ML: 5 INJECTION INTRAVENOUS at 20:03

## 2021-09-24 RX ADMIN — FUROSEMIDE 20 MG: 10 INJECTION, SOLUTION INTRAMUSCULAR; INTRAVENOUS at 20:02

## 2021-09-24 NOTE — PROGRESS NOTES
[facility]   Progress Note                 Date:   9/24/2021  Patient name:  Preethi Duran  Date of admission:  9/22/2021 10:22 AM  MRN:   [id number]  YOB: 1935  PCP:    Jm Vigil MD    Reason for Admission: Syncope and collapse [R55]  Bradycardia [R00.1]  Ventricular tachycardia ([address] [I47.2]  Cardiac dysrhythmia [I49.9]      Subjective:       Clinical Changes / Abnormalities:     Patient seen and examined. S/p PPM on 2/24. I/O last 3 completed shifts: In: 5863 [I.V.:1098]  Out: 750 [Urine:750]  No intake/output data recorded. Medications:   Scheduled Meds:    heparin (porcine)  4,000 Units IntraVENous Once    vancomycin  1,000 mg IntraVENous Once    irrigation builder   Irrigation Once    apixaban  5 mg Oral BID    amLODIPine  10 mg Oral Daily    famotidine (PEPCID) injection  20 mg IntraVENous BID    sodium chloride flush  5-40 mL IntraVENous 2 times per day     Continuous Infusions:    sodium chloride       CBC:   Recent Labs     09/23/21  0442 09/23/21  0720 09/24/21  0346   WBC 10.4 10.9 11.9*   HGB 15.6* 15.5* 14.0   PLT See Reflexed IPF Result See Reflexed IPF Result See Reflexed IPF Result     BMP:    Recent Labs     09/23/21  0442 09/23/21  0720 09/24/21  0346    138 137   K 4.1 4.1 3.6*    101 101   CO2 23 23 26   BUN 14 14 16   CREATININE 0.51 0.51 0.50   GLUCOSE 136* 139* 120*     Hepatic:   Recent Labs     09/22/21  1042   AST 28   ALT 25   BILITOT 1.67*   ALKPHOS 106*     Troponin:   Recent Labs     09/22/21  1042 09/22/21  1514   TROPHS 24* 24*     BNP: No results for input(s): BNP in the last 72 hours. Lipids: No results for input(s): CHOL, HDL in the last 72 hours. Invalid input(s): LDLCALCU  INR: No results for input(s): INR in the last 72 hours.     Objective:   Vitals: BP (!) 150/63   Pulse 60   Temp 97.9 °F (36.6 °C) (Oral)   Resp 16   Ht 5' 5\" (1.651 m)   Wt 166 lb 0.1 oz (75.3 kg)   SpO2 98%   BMI

## 2021-09-24 NOTE — PROGRESS NOTES
Other than coffee tasted no another note I did this one time    [facility]  Internal Medicine Teaching Residency Program  Inpatient Daily Progress Note  ______________________________________________________________________________    Patient: Reva Abreu  YOB: 1935   KIR:[id number]    Acct: [de-identified]     Room: [address] [place] [address]-  Admit date: 9/22/2021  Today's date: 09/24/21  Number of days in the hospital: 2    SUBJECTIVE   Admitting Diagnosis: Ventricular tachycardia ([address]     CC: Syncopal episode    Pt examined at bedside. Chart & results reviewed. No acute events overnight. Patient remained afebrile and hemodynamically stable on 2L NC oxygen. /63; HR 60; RR 16; SpO2 98%  Denies chest pain, shortness of breath, vomiting, abdominal pain, headache. Reported some nausea this morning after beakfast  - ordered Lasix 20 mg IV today; will monitor urine output and reassess tomorrow  Patient is s/p dual chamber pacemaker  Cardiology following: Heparin gtt stopped per Cardio and restarted on Eliquis 5 mg BID (home dose)  CXR 09/24: shows interval improvement in still moderate vascular congestion with stable moderate left pleural effusion. Slight decrease in size and mass effect of right superior mediastinal mass. CT-Chest ordered to evaluate mediastinal mass- will follow up    ROS:  Constitutional:  negative for chills, fevers, sweats  Respiratory:  negative for cough, dyspnea on exertion, hemoptysis, shortness of breath, wheezing  Cardiovascular:  negative for chest pain, chest pressure/discomfort, lower extremity edema, palpitations  Gastrointestinal:  negative for abdominal pain, constipation, diarrhea, nausea, vomiting  Neurological:  negative for dizziness, headache    BRIEF HISTORY     The patient is a pleasant 81 y. o. female presents with a chief complaint of syncopal episode.  The patient has PMH significant of hypertension.  The patient reported that she has been feeling well since yesterday with decreased appetite, nausea and dry heaving.  The patient's daughter called EMS this morning after she had a syncopal episode.  She reported feeling dizzy after which she \"blacked out\" and fell and hit her head on the carpet.  The patient did not have any abnormal shaking or jerking movements or urinary or fecal incontinence.  Per EMS, the patient was in sustained V. tach and she was given 150 mg of amiodarone after which V. tach was spontaneously resolved. Patient reported taking all her medications in the morning.  Patient denies any cardiac history or following up with any cardiologist.     In the ED, the patient found to be bradycardic HR 48, RR 25, /52 SPO2 92% on room air.  EKG showed bradycardia bigeminy no ST elevation was seen. Labs showed K 3.1, Trop 24, Pro-BNP 6816, WBC 12.9, Hgb 15.8, Plt 154. Patient was started on amiodarone drip and potassium 100 mEq, Mag 2g and calcium 2g was replaced. Cardiology was consulted and patient was having 4-8 beat runs of V. Tach in the ED.  Subsequently, patient's blood pressure was dropped to 77/65. Cardiolgy stopped amiodarone and switched to Dopamine and also resumed Lidocaine.  Patient was taken for emergent temporary pacemaker for possible bradycardic induced torsades and left heart cath.  Patient was admitted to cardiac ICU for further management. OBJECTIVE     Vital Signs:  BP (!) 150/63   Pulse 60   Temp 97.9 °F (36.6 °C) (Oral)   Resp 16   Ht 5' 5\" (1.651 m)   Wt 166 lb 0.1 oz (75.3 kg)   SpO2 98%   BMI 27.62 kg/m²     Temp (24hrs), Av °F (36.7 °C), Min:97.9 °F (36.6 °C), Max:98.1 °F (36.7 °C)    In: 1098   Out: 550 [Urine:550]    Physical Exam:  Constitutional: This is a well developed, well nourished, 25-29.9 - Overweight 81y.o. year old female who is alert, oriented, cooperative and in no apparent distress. Head:normocephalic and atraumatic. EENT:  PERRLA. No conjunctival injections. Septum was midline, mucosa was without erythema, exudates or cobblestoning. No thrush was noted. Neck: Supple without thyromegaly. No elevated JVP. Trachea was midline. Respiratory: Decreased breath sounds on left   Cardiovascular: Regular without murmur, clicks, gallops or rubs. Abdomen: Slightly rounded and soft without organomegaly. No rebound, rigidity or guarding was appreciated. Lymphatic: No lymphadenopathy. Musculoskeletal: Normal curvature of the spine. No gross muscle weakness. Extremities:  No lower extremity edema, ulcerations, tenderness, varicosities or erythema. Muscle size, tone and strength are normal.  No involuntary movements are noted. Skin:  Warm and dry. Good color, turgor and pigmentation. No lesions or scars.   No cyanosis or clubbing  Neurological/Psychiatric: The patient's general behavior, level of consciousness, thought content and emotional status is normal.        Medications:  Scheduled Medications:    apixaban  5 mg Oral BID    losartan  50 mg Oral Daily    And    hydroCHLOROthiazide  12.5 mg Oral Daily    vancomycin  1,000 mg IntraVENous Once    irrigation builder   Irrigation Once    famotidine (PEPCID) injection  20 mg IntraVENous BID    sodium chloride flush  5-40 mL IntraVENous 2 times per day     Continuous Infusions:    sodium chloride       PRN Medicationsondansetron, 4 mg, Q8H PRN   Or  ondansetron, 4 mg, Q6H PRN  polyethylene glycol, 17 g, Daily PRN  acetaminophen, 650 mg, Q6H PRN   Or  acetaminophen, 650 mg, Q6H PRN  sodium chloride flush, 5-40 mL, PRN  sodium chloride, 25 mL, PRN  hydrALAZINE, 10 mg, Q6H PRN        Diagnostic Labs:  CBC:   Recent Labs     09/23/21  0442 09/23/21  0720 09/24/21  0346   WBC 10.4 10.9 11.9*   RBC 4.90 4.88 4.39   HGB 15.6* 15.5* 14.0   HCT 49.3* 49.3* 43.5   .6 101.0 99.1   RDW 14.1 14.2 14.1   PLT See Reflexed IPF Result See Reflexed IPF Result See Reflexed IPF Result     BMP:   Recent Labs 09/23/21  0442 09/23/21  0720 09/24/21  0346    138 137   K 4.1 4.1 3.6*    101 101   CO2 23 23 26   BUN 14 14 16   CREATININE 0.51 0.51 0.50     BNP: No results for input(s): BNP in the last 72 hours. PT/INR: No results for input(s): PROTIME, INR in the last 72 hours. APTT:   Recent Labs     09/23/21  0720   APTT 26.1     CARDIAC ENZYMES: No results for input(s): CKMB, CKMBINDEX, TROPONINI in the last 72 hours. Invalid input(s): CKTOTAL;3  FASTING LIPID PANEL:No results found for: CHOL, HDL, TRIG  LIVER PROFILE:   Recent Labs     09/22/21  1042   AST 28   ALT 25   BILIDIR 0.65*   BILITOT 1.67*   ALKPHOS 106*      MICROBIOLOGY: No results found for: CULTURE    Imaging:    CT HEAD WO CONTRAST    Result Date: 9/23/2021  No acute intracranial abnormality. XR CHEST PORTABLE    Result Date: 9/24/2021  Stable lead placement Interval improvement in still moderate vascular congestion with stable moderate left pleural effusion and slight improvement in still moderate right basilar pleuroparenchymal process Slight decrease in size and mass effect of right superior mediastinal mass. Consider CT chest for further evaluation     XR CHEST PORTABLE    Result Date: 9/23/2021  No evidence of pneumothorax post pacemaker placement. Cardiomegaly with bilateral pulmonary effusions and pulmonary vascular congestion consistent with CHF. Increasing prominence of right paratracheal opacity which now appears to be displacing the trachea to the left which is a new finding when compared to previous. CT scan of the chest could be performed if there is any symptomatology such as stridor to exclude enlarging mass or hematoma.      XR CHEST PORTABLE    Result Date: 9/22/2021  Right jugular transvenous pacer in place Findings suggest congestive heart failure     XR CHEST PORTABLE    Result Date: 9/22/2021  Bilateral pleural effusions with bibasilar opacities that could represent atelectasis or infection       ASSESSMENT & PLAN ASSESSMENT / PLAN:     Principal Problem:    Ventricular tachycardia (HCC)  Active Problems:    Cardiac dysrhythmia    Syncope and collapse    Severe sinus bradycardia    Fall at home    Bilateral Pleural effusion    Pedal edema  Resolved Problems:    * No resolved hospital problems. *      V. Tach  - s/p emergent temporary placement due to severe bradycardia induced torsades and LHC today  - 09/23: s/p dual chamber pacemaker  - LHC: Mild CAD; medical therapy for CAD  - Echo 09/22: LVEF 35-40% and evidence of diastolic dysfunction  - Cardiology following, will follow up recommedations  - EP consulted: s/p dual chamber pacemaker yesterday  - HR 60- stable       Severe sinus bradycardia- in paced rhythm now- HR 60  - Patient's HR in 30s-40s in ED; ECG showed sinus bradycardia  - s/p emergent temporary placement due to severe bradycardia induced torsades   - patient currently in paced rhythm HR 60s  - Continue Lidocaine gtt per cardiology  - EP consulted: s/p dual chamber pacemaker yesterday       Syncope   - likely due to bradycardia at home  - CT Head: no acute intracranial abnorm  - Echo 09/22: LVEF 35-40% and evidence of diastolic dysfunction  - Cardiology following, will follow up recommedations       Bilateral Pleural effusion  - CXR showed bilateral pleural effusions with bibasilar opacities that could represent atelectasis or infection  - likely secondary to acute CHF  - CXR 09/24: shows interval improvement in still moderate vascular congestion with stable moderate left pleural effusion. Slight decrease in size and mass effect of right superior mediastinal mass.    - CT-Chest ordered to evaluate mediastinal mass- will follow up  - Echo 09/22: LVEF 35-40% and evidence of diastolic dysfunction  - Ordered 1 dose of lasix 20 mg IV today  - Diuresis well- Uout 750 mL/24 h       Paroxysmal A.fib (on Eliquis 5 mg BID at home)  - Patient is in paced rhythm after pacemaker- HR 60 NSR  - Hold Amiodarone for now per Cardiology  - Restarted Eliquis; discontinued Heparin gtt       DVT ppx : Eliquis  GI ppx: Pepcid     PT/OT: On board  Discharge Planning / [place]: Patient will go home upon discharge    Kay Womack MD  Internal Medicine Resident, PGY-1  [address], [place]  9/24/2021, 12:11 PM

## 2021-09-25 ENCOUNTER — APPOINTMENT (OUTPATIENT)
Dept: CT IMAGING | Age: 86
DRG: 242 | End: 2021-09-25
Payer: MEDICARE

## 2021-09-25 LAB
ABSOLUTE EOS #: 0.09 K/UL (ref 0–0.4)
ABSOLUTE IMMATURE GRANULOCYTE: 0 K/UL (ref 0–0.3)
ABSOLUTE LYMPH #: 0.9 K/UL (ref 1–4.8)
ABSOLUTE MONO #: 0.9 K/UL (ref 0.1–0.8)
ANION GAP SERPL CALCULATED.3IONS-SCNC: 8 MMOL/L (ref 9–17)
BASOPHILS # BLD: 0 % (ref 0–2)
BASOPHILS ABSOLUTE: 0 K/UL (ref 0–0.2)
BUN BLDV-MCNC: 12 MG/DL (ref 8–23)
BUN/CREAT BLD: ABNORMAL (ref 9–20)
CALCIUM SERPL-MCNC: 8.8 MG/DL (ref 8.6–10.4)
CHLORIDE BLD-SCNC: 100 MMOL/L (ref 98–107)
CO2: 31 MMOL/L (ref 20–31)
CREAT SERPL-MCNC: 0.49 MG/DL (ref 0.5–0.9)
DIFFERENTIAL TYPE: ABNORMAL
EOSINOPHILS RELATIVE PERCENT: 1 % (ref 1–4)
GFR AFRICAN AMERICAN: >60 ML/MIN
GFR NON-AFRICAN AMERICAN: >60 ML/MIN
GFR SERPL CREATININE-BSD FRML MDRD: ABNORMAL ML/MIN/{1.73_M2}
GFR SERPL CREATININE-BSD FRML MDRD: ABNORMAL ML/MIN/{1.73_M2}
GLUCOSE BLD-MCNC: 108 MG/DL (ref 70–99)
HCT VFR BLD CALC: 39.9 % (ref 36.3–47.1)
HEMOGLOBIN: 13 G/DL (ref 11.9–15.1)
IMMATURE GRANULOCYTES: 0 %
LYMPHOCYTES # BLD: 10 % (ref 24–44)
MCH RBC QN AUTO: 32.2 PG (ref 25.2–33.5)
MCHC RBC AUTO-ENTMCNC: 32.6 G/DL (ref 28.4–34.8)
MCV RBC AUTO: 98.8 FL (ref 82.6–102.9)
MONOCYTES # BLD: 10 % (ref 1–7)
MORPHOLOGY: NORMAL
NRBC AUTOMATED: 0 PER 100 WBC
PDW BLD-RTO: 13.9 % (ref 11.8–14.4)
PLATELET # BLD: ABNORMAL K/UL (ref 138–453)
PLATELET ESTIMATE: ABNORMAL
PLATELET, FLUORESCENCE: 129 K/UL (ref 138–453)
PLATELET, IMMATURE FRACTION: 14.7 % (ref 1.1–10.3)
PMV BLD AUTO: ABNORMAL FL (ref 8.1–13.5)
POTASSIUM SERPL-SCNC: 3.8 MMOL/L (ref 3.7–5.3)
PROCALCITONIN: 0.21 NG/ML
RBC # BLD: 4.04 M/UL (ref 3.95–5.11)
RBC # BLD: ABNORMAL 10*6/UL
SEG NEUTROPHILS: 79 % (ref 36–66)
SEGMENTED NEUTROPHILS ABSOLUTE COUNT: 7.11 K/UL (ref 1.8–7.7)
SODIUM BLD-SCNC: 139 MMOL/L (ref 135–144)
WBC # BLD: 9 K/UL (ref 3.5–11.3)
WBC # BLD: ABNORMAL 10*3/UL

## 2021-09-25 PROCEDURE — 85025 COMPLETE CBC W/AUTO DIFF WBC: CPT

## 2021-09-25 PROCEDURE — 80048 BASIC METABOLIC PNL TOTAL CA: CPT

## 2021-09-25 PROCEDURE — 94761 N-INVAS EAR/PLS OXIMETRY MLT: CPT

## 2021-09-25 PROCEDURE — 94660 CPAP INITIATION&MGMT: CPT

## 2021-09-25 PROCEDURE — 84145 PROCALCITONIN (PCT): CPT

## 2021-09-25 PROCEDURE — 6370000000 HC RX 637 (ALT 250 FOR IP): Performed by: STUDENT IN AN ORGANIZED HEALTH CARE EDUCATION/TRAINING PROGRAM

## 2021-09-25 PROCEDURE — 2580000003 HC RX 258: Performed by: STUDENT IN AN ORGANIZED HEALTH CARE EDUCATION/TRAINING PROGRAM

## 2021-09-25 PROCEDURE — 71250 CT THORAX DX C-: CPT

## 2021-09-25 PROCEDURE — 2500000003 HC RX 250 WO HCPCS: Performed by: STUDENT IN AN ORGANIZED HEALTH CARE EDUCATION/TRAINING PROGRAM

## 2021-09-25 PROCEDURE — 99232 SBSQ HOSP IP/OBS MODERATE 35: CPT | Performed by: INTERNAL MEDICINE

## 2021-09-25 PROCEDURE — 2060000000 HC ICU INTERMEDIATE R&B

## 2021-09-25 PROCEDURE — 6360000002 HC RX W HCPCS: Performed by: STUDENT IN AN ORGANIZED HEALTH CARE EDUCATION/TRAINING PROGRAM

## 2021-09-25 PROCEDURE — 36415 COLL VENOUS BLD VENIPUNCTURE: CPT

## 2021-09-25 PROCEDURE — 2700000000 HC OXYGEN THERAPY PER DAY

## 2021-09-25 PROCEDURE — 85055 RETICULATED PLATELET ASSAY: CPT

## 2021-09-25 RX ORDER — AMIODARONE HYDROCHLORIDE 200 MG/1
200 TABLET ORAL DAILY
Qty: 30 TABLET | Refills: 1 | Status: CANCELLED | OUTPATIENT
Start: 2021-09-25

## 2021-09-25 RX ORDER — LOSARTAN POTASSIUM AND HYDROCHLOROTHIAZIDE 12.5; 5 MG/1; MG/1
1 TABLET ORAL DAILY
Qty: 90 TABLET | Refills: 1 | Status: CANCELLED | OUTPATIENT
Start: 2021-09-25

## 2021-09-25 RX ORDER — FUROSEMIDE 10 MG/ML
20 INJECTION INTRAMUSCULAR; INTRAVENOUS ONCE
Status: COMPLETED | OUTPATIENT
Start: 2021-09-25 | End: 2021-09-25

## 2021-09-25 RX ORDER — METOPROLOL SUCCINATE 25 MG/1
25 TABLET, EXTENDED RELEASE ORAL 2 TIMES DAILY
Qty: 30 TABLET | Refills: 1 | Status: CANCELLED | OUTPATIENT
Start: 2021-09-25

## 2021-09-25 RX ORDER — METOPROLOL SUCCINATE 50 MG/1
50 TABLET, EXTENDED RELEASE ORAL 2 TIMES DAILY
Status: DISCONTINUED | OUTPATIENT
Start: 2021-09-25 | End: 2021-09-25

## 2021-09-25 RX ORDER — METOPROLOL SUCCINATE 25 MG/1
25 TABLET, EXTENDED RELEASE ORAL 2 TIMES DAILY
Status: DISCONTINUED | OUTPATIENT
Start: 2021-09-25 | End: 2021-09-27 | Stop reason: HOSPADM

## 2021-09-25 RX ORDER — AMIODARONE HYDROCHLORIDE 200 MG/1
200 TABLET ORAL DAILY
Status: DISCONTINUED | OUTPATIENT
Start: 2021-09-25 | End: 2021-09-25

## 2021-09-25 RX ORDER — POTASSIUM CHLORIDE 20 MEQ/1
20 TABLET, EXTENDED RELEASE ORAL DAILY
Status: DISCONTINUED | OUTPATIENT
Start: 2021-09-25 | End: 2021-09-26

## 2021-09-25 RX ORDER — POTASSIUM CHLORIDE 20 MEQ/1
20 TABLET, EXTENDED RELEASE ORAL ONCE
Status: COMPLETED | OUTPATIENT
Start: 2021-09-25 | End: 2021-09-25

## 2021-09-25 RX ORDER — METHIMAZOLE 5 MG/1
5 TABLET ORAL DAILY
Status: DISCONTINUED | OUTPATIENT
Start: 2021-09-25 | End: 2021-09-27 | Stop reason: HOSPADM

## 2021-09-25 RX ADMIN — SODIUM CHLORIDE, PRESERVATIVE FREE 10 ML: 5 INJECTION INTRAVENOUS at 19:46

## 2021-09-25 RX ADMIN — FAMOTIDINE 20 MG: 10 INJECTION INTRAVENOUS at 19:46

## 2021-09-25 RX ADMIN — METOPROLOL SUCCINATE 25 MG: 25 TABLET, FILM COATED, EXTENDED RELEASE ORAL at 09:01

## 2021-09-25 RX ADMIN — POTASSIUM CHLORIDE 20 MEQ: 1500 TABLET, EXTENDED RELEASE ORAL at 10:59

## 2021-09-25 RX ADMIN — FAMOTIDINE 20 MG: 10 INJECTION INTRAVENOUS at 08:55

## 2021-09-25 RX ADMIN — SODIUM CHLORIDE, PRESERVATIVE FREE 10 ML: 5 INJECTION INTRAVENOUS at 08:56

## 2021-09-25 RX ADMIN — HYDROCHLOROTHIAZIDE 12.5 MG: 25 TABLET ORAL at 08:55

## 2021-09-25 RX ADMIN — METHIMAZOLE 5 MG: 5 TABLET ORAL at 12:57

## 2021-09-25 RX ADMIN — LOSARTAN POTASSIUM 50 MG: 50 TABLET, FILM COATED ORAL at 08:56

## 2021-09-25 RX ADMIN — METOPROLOL SUCCINATE 25 MG: 25 TABLET, FILM COATED, EXTENDED RELEASE ORAL at 19:46

## 2021-09-25 RX ADMIN — APIXABAN 5 MG: 5 TABLET, FILM COATED ORAL at 08:56

## 2021-09-25 RX ADMIN — POTASSIUM CHLORIDE 20 MEQ: 1500 TABLET, EXTENDED RELEASE ORAL at 09:01

## 2021-09-25 RX ADMIN — FUROSEMIDE 20 MG: 10 INJECTION, SOLUTION INTRAMUSCULAR; INTRAVENOUS at 10:28

## 2021-09-25 NOTE — PROGRESS NOTES
this morning after she had a syncopal episode.  She reported feeling dizzy after which she \"blacked out\" and fell and hit her head on the carpet.  The patient did not have any abnormal shaking or jerking movements or urinary or fecal incontinence.  Per EMS, the patient was in sustained V. tach and she was given 150 mg of amiodarone after which V. tach was spontaneously resolved. Patient reported taking all her medications in the morning.  Patient denies any cardiac history or following up with any cardiologist.     In the ED, the patient found to be bradycardic HR 48, RR 25, /52 SPO2 92% on room air.  EKG showed bradycardia bigeminy no ST elevation was seen. Labs showed K 3.1, Trop 24, Pro-BNP 6816, WBC 12.9, Hgb 15.8, Plt 154. Patient was started on amiodarone drip and potassium 100 mEq, Mag 2g and calcium 2g was replaced. Cardiology was consulted and patient was having 4-8 beat runs of V. Tach in the ED.  Subsequently, patient's blood pressure was dropped to 77/65. Cardiolgy stopped amiodarone and switched to Dopamine and also resumed Lidocaine.  Patient was taken for emergent temporary pacemaker for possible bradycardic induced torsades and left heart cath.  Patient was admitted to cardiac ICU for further management. OBJECTIVE     Vital Signs:  BP (!) 101/44   Pulse 60   Temp 98.4 °F (36.9 °C) (Oral)   Resp 27   Ht 5' 5\" (1.651 m)   Wt 165 lb 9.1 oz (75.1 kg)   SpO2 94%   BMI 27.55 kg/m²     Temp (24hrs), Av.1 °F (36.7 °C), Min:97.9 °F (36.6 °C), Max:98.4 °F (36.9 °C)    In: 360   Out: 1075 [Urine:1075]    Physical Exam:  Constitutional: This is a well developed, well nourished, 25-29.9 - Overweight 81y.o. year old female who is alert, oriented, cooperative and in no apparent distress. Head:normocephalic and atraumatic. EENT:  PERRLA. No conjunctival injections. Septum was midline, mucosa was without erythema, exudates or cobblestoning. No thrush was noted.    Neck: Supple without thyromegaly. No elevated JVP. Trachea was midline. Respiratory: Chest was symmetrical without dullness to percussion. Breath sounds bilaterally were clear to auscultation. There were no wheezes, rhonchi or rales. There is no intercostal retraction or use of accessory muscles. No egophony noted. Cardiovascular: Regular without murmur, clicks, gallops or rubs. Abdomen: Slightly rounded and soft without organomegaly. No rebound, rigidity or guarding was appreciated. Lymphatic: No lymphadenopathy. Musculoskeletal: Normal curvature of the spine. No gross muscle weakness. Extremities:  No lower extremity edema, ulcerations, tenderness, varicosities or erythema. Muscle size, tone and strength are normal.  No involuntary movements are noted. Skin:  Warm and dry. Good color, turgor and pigmentation. No lesions or scars.   No cyanosis or clubbing  Neurological/Psychiatric: The patient's general behavior, level of consciousness, thought content and emotional status is normal.        Medications:  Scheduled Medications:    amiodarone  200 mg Oral Daily    metoprolol succinate  25 mg Oral BID    potassium chloride  20 mEq Oral Once    apixaban  5 mg Oral BID    losartan  50 mg Oral Daily    And    hydroCHLOROthiazide  12.5 mg Oral Daily    vancomycin  1,000 mg IntraVENous Once    irrigation builder   Irrigation Once    famotidine (PEPCID) injection  20 mg IntraVENous BID    sodium chloride flush  5-40 mL IntraVENous 2 times per day     Continuous Infusions:    sodium chloride       PRN Medicationsondansetron, 4 mg, Q8H PRN   Or  ondansetron, 4 mg, Q6H PRN  polyethylene glycol, 17 g, Daily PRN  acetaminophen, 650 mg, Q6H PRN   Or  acetaminophen, 650 mg, Q6H PRN  sodium chloride flush, 5-40 mL, PRN  sodium chloride, 25 mL, PRN  hydrALAZINE, 10 mg, Q6H PRN        Diagnostic Labs:  CBC:   Recent Labs     09/23/21  0720 09/24/21  0346 09/25/21  0622   WBC 10.9 11.9* 9.0   RBC 4.88 4.39 4.04   HGB 15.5* 14.0 13.0   HCT 49.3* 43.5 39.9   .0 99.1 98.8   RDW 14.2 14.1 13.9   PLT See Reflexed IPF Result See Reflexed IPF Result See Reflexed IPF Result     BMP:   Recent Labs     09/23/21  0720 09/24/21  0346 09/25/21  0622    137 139   K 4.1 3.6* 3.8    101 100   CO2 23 26 31   BUN 14 16 12   CREATININE 0.51 0.50 0.49*     BNP: No results for input(s): BNP in the last 72 hours. PT/INR: No results for input(s): PROTIME, INR in the last 72 hours. APTT:   Recent Labs     09/23/21  0720   APTT 26.1     CARDIAC ENZYMES: No results for input(s): CKMB, CKMBINDEX, TROPONINI in the last 72 hours. Invalid input(s): CKTOTAL;3  FASTING LIPID PANEL:No results found for: CHOL, HDL, TRIG  LIVER PROFILE:   Recent Labs     09/22/21  1042   AST 28   ALT 25   BILIDIR 0.65*   BILITOT 1.67*   ALKPHOS 106*      MICROBIOLOGY: No results found for: CULTURE    Imaging:    CT HEAD WO CONTRAST    Result Date: 9/23/2021  No acute intracranial abnormality. XR CHEST PORTABLE    Result Date: 9/24/2021  Stable lead placement Interval improvement in still moderate vascular congestion with stable moderate left pleural effusion and slight improvement in still moderate right basilar pleuroparenchymal process Slight decrease in size and mass effect of right superior mediastinal mass. Consider CT chest for further evaluation     XR CHEST PORTABLE    Result Date: 9/23/2021  No evidence of pneumothorax post pacemaker placement. Cardiomegaly with bilateral pulmonary effusions and pulmonary vascular congestion consistent with CHF. Increasing prominence of right paratracheal opacity which now appears to be displacing the trachea to the left which is a new finding when compared to previous. CT scan of the chest could be performed if there is any symptomatology such as stridor to exclude enlarging mass or hematoma.      XR CHEST PORTABLE    Result Date: 9/22/2021  Right jugular transvenous pacer in place Findings suggest congestive heart failure     XR CHEST PORTABLE    Result Date: 9/22/2021  Bilateral pleural effusions with bibasilar opacities that could represent atelectasis or infection       ASSESSMENT & PLAN     ASSESSMENT / PLAN:     Principal Problem:    Ventricular tachycardia (HCC)  Active Problems:    Cardiac dysrhythmia    Syncope and collapse    Severe sinus bradycardia    Fall at home    Bilateral Pleural effusion    Pedal edema  Resolved Problems:    * No resolved hospital problems. *      V. Tach  - s/p emergent temporary placement due to severe bradycardia induced torsades and LHC today  - 09/23: s/p dual chamber pacemaker  - LHC: Mild CAD; medical therapy for CAD  - Echo 09/22: LVEF 35-40% and evidence of diastolic dysfunction  - Cardiology following, will follow up recommedations  - EP consulted: s/p dual chamber pacemaker  - HR 60- stable        Severe sinus bradycardia- in paced rhythm now- HR 60  - Patient's HR in 30s-40s in ED; ECG showed sinus bradycardia  - s/p emergent temporary placement due to severe bradycardia induced torsades   - patient currently in paced rhythm HR 60s  - Continue Lidocaine gtt per cardiology  - EP consulted: s/p dual chamber pacemaker yesterday        Syncope   - likely due to bradycardia at home  - CT Head: no acute intracranial abnorm  - Echo 09/22: LVEF 35-40% and evidence of diastolic dysfunction  - Cardiology following, will follow up recommedations        Bilateral Pleural effusion  - likely secondary to acute CHF  - CXR 09/24: shows interval improvement in still moderate vascular congestion with stable moderate left pleural effusion. Slight decrease in size and mass effect of right superior mediastinal mass.    - CT-Chest 09/25: shows moderate bilateral effusions; edema likely; pneumonia cannot be excluded; mediastinal mass- large nodule off right lobe of thyroid gland; large postprocedural hematoma at the insertion site of generator of the pacemaker; small pericardial effusion.   - will need cardiology input regarding hematoma  - Echo 09/22: LVEF 35-40% and evidence of diastolic dysfunction  - Received 1 dose of lasix 20 mg IV today  - Diuresis well- Uout 1.075 L/24 h        Paroxysmal A.fib (on Eliquis 5 mg BID at home)  - Patient is in paced rhythm after pacemaker- HR 60 NSR  - Stopped Amiodarone due to prolonged QT per Cardiology  - Continue Eliquis 5 mg BID  - Started Metoprolol 25 mg BID       DVT ppx : Eliquis  GI ppx: Pepcid    PT/OT: On board  Discharge Planning / Kailee [place]r: Patient will go home upon discharge    Ian Cheng MD  Internal Medicine Resident, PGY-1  [facility];  [place], [place]  9/25/2021, 8:30 AM

## 2021-09-25 NOTE — PROGRESS NOTES
Patient was seen at bedside. Hematoma noted at incision site. Will hold Eliquis  Will need hematoma evacuation on Monday  Plan was discussed with daughter.        Reta RICHARD fellow

## 2021-09-25 NOTE — PROGRESS NOTES
[facility]   Progress Note                 Date:   9/25/2021  Patient name:  Preethi Duran  Date of admission:  9/22/2021 10:22 AM  MRN:   [id number]  YOB: 1935  PCP:    Jm Vigil MD    Reason for Admission: Syncope and collapse [R55]  Bradycardia [R00.1]  Ventricular tachycardia ([address] [I47.2]  Cardiac dysrhythmia [I49.9]      Subjective:       Clinical Changes / Abnormalities:     Patient seen and examined. S/p PPM on 2/24. I/O last 3 completed shifts: In: 1080 [P.O.:1080]  Out: 1075 [Urine:1075]  No intake/output data recorded. Medications:   Scheduled Meds:    apixaban  5 mg Oral BID    losartan  50 mg Oral Daily    And    hydroCHLOROthiazide  12.5 mg Oral Daily    vancomycin  1,000 mg IntraVENous Once    irrigation builder   Irrigation Once    famotidine (PEPCID) injection  20 mg IntraVENous BID    sodium chloride flush  5-40 mL IntraVENous 2 times per day     Continuous Infusions:    sodium chloride       CBC:   Recent Labs     09/23/21  0720 09/24/21  0346 09/25/21  0622   WBC 10.9 11.9* 9.0   HGB 15.5* 14.0 13.0   PLT See Reflexed IPF Result See Reflexed IPF Result See Reflexed IPF Result     BMP:    Recent Labs     09/23/21  0720 09/24/21  0346 09/25/21  0622    137 139   K 4.1 3.6* 3.8    101 100   CO2 23 26 31   BUN 14 16 12   CREATININE 0.51 0.50 0.49*   GLUCOSE 139* 120* 108*     Hepatic:   Recent Labs     09/22/21  1042   AST 28   ALT 25   BILITOT 1.67*   ALKPHOS 106*     Troponin:   Recent Labs     09/22/21  1042 09/22/21  1514   TROPHS 24* 24*     BNP: No results for input(s): BNP in the last 72 hours. Lipids: No results for input(s): CHOL, HDL in the last 72 hours. Invalid input(s): LDLCALCU  INR: No results for input(s): INR in the last 72 hours.     Objective:   Vitals: BP (!) 101/44   Pulse 60   Temp 98.4 °F (36.9 °C) (Oral)   Resp 27   Ht 5' 5\" (1.651 m)   Wt 165 lb 9.1 oz (75.1 kg)   SpO2 94%   BMI 27.55 kg/m²   General appearance: Alert. No acute distress. HEENT: Head: Normal, normocephalic, atraumatic. Neck: no JVD, trachea midline  Lungs: Clear to auscultation bilaterally, no wheeze or crackles  Heart: Regular rate and rhythm, S1, S2 normal, no murmur  Abdomen: Soft, non-tender  Extremities: No edema  Neurologic: No focal neurologic deficits    STRESS:    ECHO:  Global left ventricular systolic function is moderately reduced. Estimated EF 35-40%. Calculated ejection fraction 37% by Estrada's method. Dyskinetic inferoseptal and anteroseptal walls (likely from paced rhythm)  Evidence of diastolic dysfunction. Normal right ventricular size and function. Left atrium is moderately dilated. Moderate eccentric mitral regurgitation. Moderate tricuspid regurgitation. Estimated right ventricular systolic pressure is [id number]. Small to moderate posterior pericardial effusion seen with no echo signs of  tamponade. Left pleural effusion. CATH:  Angiographic Findings      Cardiac Arteries and Lesion Findings     LMCA: Mild irregularities 10-20%.     LAD: Mild irregularities 20-30%.     LCx: Mild irregularities 10-20%.     RCA: Mild irregularities 10-20%.     Coronary Tree      Dominance: Right         Assessment:   1. Syncopal episode  2    one episode of sustained ventricular tachycardia resolved with amiodarone  3    sinus bradycardia requiring temporary pacemaker. Underlying rhythm was 2-1 AV block. 4    runs of PVCs and nonsustained V. Tach  5    history of paroxysmal atrial fibrillation. 6   nonischemic cardiomyopathy EF of 35 to 40%. Plan / Recommendations:   1 patient had PPM placement. PPM interrogated  2 metoprolol 25 mg twice daily. 3 on losartan hydrochlorothiazide. 4 okay to discharge from cardiology standpoint. Will sign off. Thank you for allowing us to participate in [address] [place] [address].      Electronically signed on 09/25/21 at 7:02 AM by:    Vince Mercado MD, MD   Fellow, Cardiovascular Diseases  [facility]    I performed a history and physical examination of the patient and discussed management with the resident. I reviewed the residents note and agree with the documented findings and plan of care. Any areas of disagreement are noted on the chart. I was personally present for the key portions of any procedures. I have documented in the chart those procedures where I was not present during the key portions. I have personally evaluated this patient and have completed at least one if not all key elements of the E/M (history, physical exam, and MDM). Additional findings are as noted. NO amiodarone due to prolonged QT. CT chest ordered by primary team and not resulted yet. If stable findings on CT chest, ok to discharge from cardiology perspective.     Sonam Rose MD

## 2021-09-25 NOTE — PLAN OF CARE
Problem: Skin Integrity:  Goal: Will show no infection signs and symptoms  Description: Will show no infection signs and symptoms  Outcome: Ongoing   Pt resting in bed comfortably. Side rails up x2. Bed locked in lowest position. Call light within reach. Will continue to monitor.

## 2021-09-26 LAB
ABSOLUTE EOS #: 0.06 K/UL (ref 0–0.44)
ABSOLUTE IMMATURE GRANULOCYTE: 0.05 K/UL (ref 0–0.3)
ABSOLUTE LYMPH #: 0.88 K/UL (ref 1.1–3.7)
ABSOLUTE MONO #: 0.99 K/UL (ref 0.1–1.2)
ANION GAP SERPL CALCULATED.3IONS-SCNC: 6 MMOL/L (ref 9–17)
BASOPHILS # BLD: 1 % (ref 0–2)
BASOPHILS ABSOLUTE: 0.04 K/UL (ref 0–0.2)
BUN BLDV-MCNC: 13 MG/DL (ref 8–23)
BUN/CREAT BLD: ABNORMAL (ref 9–20)
CALCIUM SERPL-MCNC: 8.7 MG/DL (ref 8.6–10.4)
CHLORIDE BLD-SCNC: 97 MMOL/L (ref 98–107)
CO2: 30 MMOL/L (ref 20–31)
CREAT SERPL-MCNC: 0.46 MG/DL (ref 0.5–0.9)
DIFFERENTIAL TYPE: ABNORMAL
EOSINOPHILS RELATIVE PERCENT: 1 % (ref 1–4)
GFR AFRICAN AMERICAN: >60 ML/MIN
GFR NON-AFRICAN AMERICAN: >60 ML/MIN
GFR SERPL CREATININE-BSD FRML MDRD: ABNORMAL ML/MIN/{1.73_M2}
GFR SERPL CREATININE-BSD FRML MDRD: ABNORMAL ML/MIN/{1.73_M2}
GLUCOSE BLD-MCNC: 102 MG/DL (ref 70–99)
HCT VFR BLD CALC: 37.4 % (ref 36.3–47.1)
HEMOGLOBIN: 11.9 G/DL (ref 11.9–15.1)
IMMATURE GRANULOCYTES: 1 %
LYMPHOCYTES # BLD: 11 % (ref 24–43)
MCH RBC QN AUTO: 31.8 PG (ref 25.2–33.5)
MCHC RBC AUTO-ENTMCNC: 31.8 G/DL (ref 28.4–34.8)
MCV RBC AUTO: 100 FL (ref 82.6–102.9)
MONOCYTES # BLD: 13 % (ref 3–12)
NRBC AUTOMATED: 0 PER 100 WBC
PDW BLD-RTO: 14 % (ref 11.8–14.4)
PLATELET # BLD: 128 K/UL (ref 138–453)
PLATELET ESTIMATE: ABNORMAL
PMV BLD AUTO: 12.9 FL (ref 8.1–13.5)
POTASSIUM SERPL-SCNC: 3.8 MMOL/L (ref 3.7–5.3)
RBC # BLD: 3.74 M/UL (ref 3.95–5.11)
RBC # BLD: ABNORMAL 10*6/UL
SEG NEUTROPHILS: 73 % (ref 36–65)
SEGMENTED NEUTROPHILS ABSOLUTE COUNT: 5.72 K/UL (ref 1.5–8.1)
SODIUM BLD-SCNC: 133 MMOL/L (ref 135–144)
WBC # BLD: 7.7 K/UL (ref 3.5–11.3)
WBC # BLD: ABNORMAL 10*3/UL

## 2021-09-26 PROCEDURE — 6370000000 HC RX 637 (ALT 250 FOR IP): Performed by: STUDENT IN AN ORGANIZED HEALTH CARE EDUCATION/TRAINING PROGRAM

## 2021-09-26 PROCEDURE — 2500000003 HC RX 250 WO HCPCS: Performed by: STUDENT IN AN ORGANIZED HEALTH CARE EDUCATION/TRAINING PROGRAM

## 2021-09-26 PROCEDURE — 99232 SBSQ HOSP IP/OBS MODERATE 35: CPT | Performed by: INTERNAL MEDICINE

## 2021-09-26 PROCEDURE — 6360000002 HC RX W HCPCS

## 2021-09-26 PROCEDURE — 2060000000 HC ICU INTERMEDIATE R&B

## 2021-09-26 PROCEDURE — 85025 COMPLETE CBC W/AUTO DIFF WBC: CPT

## 2021-09-26 PROCEDURE — 2580000003 HC RX 258: Performed by: STUDENT IN AN ORGANIZED HEALTH CARE EDUCATION/TRAINING PROGRAM

## 2021-09-26 PROCEDURE — 92610 EVALUATE SWALLOWING FUNCTION: CPT

## 2021-09-26 PROCEDURE — 36415 COLL VENOUS BLD VENIPUNCTURE: CPT

## 2021-09-26 PROCEDURE — 80048 BASIC METABOLIC PNL TOTAL CA: CPT

## 2021-09-26 RX ORDER — FUROSEMIDE 10 MG/ML
20 INJECTION INTRAMUSCULAR; INTRAVENOUS ONCE
Status: COMPLETED | OUTPATIENT
Start: 2021-09-26 | End: 2021-09-26

## 2021-09-26 RX ORDER — HYDRALAZINE HYDROCHLORIDE 20 MG/ML
10 INJECTION INTRAMUSCULAR; INTRAVENOUS EVERY 6 HOURS PRN
Status: DISCONTINUED | OUTPATIENT
Start: 2021-09-26 | End: 2021-09-26

## 2021-09-26 RX ORDER — POTASSIUM CHLORIDE 7.45 MG/ML
20 INJECTION INTRAVENOUS ONCE
Status: DISCONTINUED | OUTPATIENT
Start: 2021-09-26 | End: 2021-09-26

## 2021-09-26 RX ADMIN — HYDROCHLOROTHIAZIDE 12.5 MG: 25 TABLET ORAL at 09:35

## 2021-09-26 RX ADMIN — METHIMAZOLE 5 MG: 5 TABLET ORAL at 09:42

## 2021-09-26 RX ADMIN — FAMOTIDINE 20 MG: 10 INJECTION INTRAVENOUS at 19:58

## 2021-09-26 RX ADMIN — FAMOTIDINE 20 MG: 10 INJECTION INTRAVENOUS at 09:34

## 2021-09-26 RX ADMIN — FUROSEMIDE 20 MG: 10 INJECTION, SOLUTION INTRAMUSCULAR; INTRAVENOUS at 09:35

## 2021-09-26 RX ADMIN — SODIUM CHLORIDE, PRESERVATIVE FREE 10 ML: 5 INJECTION INTRAVENOUS at 19:57

## 2021-09-26 RX ADMIN — METOPROLOL SUCCINATE 25 MG: 25 TABLET, FILM COATED, EXTENDED RELEASE ORAL at 19:58

## 2021-09-26 RX ADMIN — METOPROLOL SUCCINATE 25 MG: 25 TABLET, FILM COATED, EXTENDED RELEASE ORAL at 09:35

## 2021-09-26 RX ADMIN — POTASSIUM CHLORIDE 20 MEQ: 1500 TABLET, EXTENDED RELEASE ORAL at 09:35

## 2021-09-26 RX ADMIN — LOSARTAN POTASSIUM 50 MG: 50 TABLET, FILM COATED ORAL at 09:35

## 2021-09-26 RX ADMIN — SODIUM CHLORIDE, PRESERVATIVE FREE 10 ML: 5 INJECTION INTRAVENOUS at 09:35

## 2021-09-26 ASSESSMENT — PAIN SCALES - GENERAL
PAINLEVEL_OUTOF10: 0
PAINLEVEL_OUTOF10: 0

## 2021-09-26 NOTE — PLAN OF CARE
PATIENT REFUSES TO WEAR BIPAP     [x] Risks and benefits explained to patient   [x] Patient refuses to wear Bipap stating no, im comfortable on NC  [x] Patient verbalizes understanding of information presented.

## 2021-09-26 NOTE — PROGRESS NOTES
[facility]  Internal Medicine Teaching Residency Program  Inpatient Daily Progress Note  ______________________________________________________________________________    Patient: Khushi Curtis  YOB: 1935   JXO:[id number]    Acct: [de-identified]     Room: [address] [place] [address]  Admit date: 9/22/2021  Today's date: 09/26/21  Number of days in the hospital: 4    SUBJECTIVE   Admitting Diagnosis: Ventricular tachycardia ([address]     CC: Syncopal episode     Pt examined at bedside. Chart & results reviewed. No acute events overnight. Patient remained afebrile and hemodynamically stable on 3L NC saturating >94%  /60; HR 60; RR 19; SpO2 97%  The patient was on 2L NC oxygen overnight. Sitting up in chair this morning and oxygen requirement was increased to 3L due to desaturations in 80s. Will try weaning down today  She denies chest pain, shortness of breath, dizziness, vomiting, abdominal pain, diarrhea. Reported no BM since admission; prefers to take prune juice for constipation, RN informed. Received Lasix 20 mg IV yesterday; Uout 1.4L/24 h  - will give 1 dose lasix today  Cardiology following: Plan for possible evacuation of hematoma around the pacemaker placement site after reexploration of pacemaker pocket; s/p dual chamber permanent pacemaker placement  Speech therapy swallow evaluation pending  Labs reviewed: K 3.8<-- daily replacement; Cr 0.46; Hgb 11.9<--13<--14; Plt 128; other labs wnl    ROS:  Constitutional:  negative for chills, fevers, sweats  Respiratory:  negative for cough, dyspnea on exertion, hemoptysis, shortness of breath, wheezing  Cardiovascular:  negative for chest pain, chest pressure/discomfort, lower extremity edema, palpitations  Gastrointestinal:  negative for abdominal pain, constipation, diarrhea, nausea, vomiting  Neurological:  negative for dizziness, headache    BRIEF HISTORY     The patient is a pleasant 85 y. o. female presents with a chief complaint of syncopal episode.  The patient has PMH significant of hypertension.  The patient reported that she has been feeling well since yesterday with decreased appetite, nausea and dry heaving.  The patient's daughter called EMS this morning after she had a syncopal episode.  She reported feeling dizzy after which she \"blacked out\" and fell and hit her head on the carpet.  The patient did not have any abnormal shaking or jerking movements or urinary or fecal incontinence.  Per EMS, the patient was in sustained V. tach and she was given 150 mg of amiodarone after which V. tach was spontaneously resolved. Patient reported taking all her medications in the morning.  Patient denies any cardiac history or following up with any cardiologist.     In the ED, the patient found to be bradycardic HR 48, RR 25, /52 SPO2 92% on room air.  EKG showed bradycardia bigeminy no ST elevation was seen. Labs showed K 3.1, Trop 24, Pro-BNP 6816, WBC 12.9, Hgb 15.8, Plt 154. Patient was started on amiodarone drip and potassium 100 mEq, Mag 2g and calcium 2g was replaced. Cardiology was consulted and patient was having 4-8 beat runs of V. Tach in the ED.  Subsequently, patient's blood pressure was dropped to 77/65. Cardiolgy stopped amiodarone and switched to Dopamine and also resumed Lidocaine.  Patient was taken for emergent temporary pacemaker for possible bradycardic induced torsades and left heart cath.  Patient was admitted to cardiac ICU for further management.     OBJECTIVE     Vital Signs:  BP (!) 125/57   Pulse 61   Temp 97.8 °F (36.6 °C) (Oral)   Resp 23   Ht 5' 5\" (1.651 m)   Wt 165 lb 9.1 oz (75.1 kg)   SpO2 95%   BMI 27.55 kg/m²     Temp (24hrs), Av.7 °F (36.5 °C), Min:97.6 °F (36.4 °C), Max:97.8 °F (36.6 °C)    In: 400   Out: 250 [Urine:250]    Physical Exam:  Constitutional: This is a well developed, well nourished, 25-29.9 - Overweight 81y.o. year old female who is alert, oriented, cooperative and in no apparent distress. Head:normocephalic and atraumatic. EENT:  PERRLA. No conjunctival injections. Septum was midline, mucosa was without erythema, exudates or cobblestoning. No thrush was noted. Neck: Supple without thyromegaly. No elevated JVP. Trachea was midline. Respiratory: Decreased breath sounds bilaterally. Cardiovascular: Regular without murmur, clicks, gallops or rubs. Abdomen: Slightly rounded and soft without organomegaly. No rebound, rigidity or guarding was appreciated. Lymphatic: No lymphadenopathy. Musculoskeletal: Normal curvature of the spine. No gross muscle weakness. Extremities:  1+ pedal edema bilaterally   Skin:  Warm and dry. Good color, turgor and pigmentation. No lesions or scars.   No cyanosis or clubbing  Neurological/Psychiatric: The patient's general behavior, level of consciousness, thought content and emotional status is normal.        Medications:  Scheduled Medications:    metoprolol succinate  25 mg Oral BID    potassium chloride  20 mEq Oral Daily    methIMAzole  5 mg Oral Daily    [Held by provider] apixaban  5 mg Oral BID    losartan  50 mg Oral Daily    And    hydroCHLOROthiazide  12.5 mg Oral Daily    vancomycin  1,000 mg IntraVENous Once    irrigation builder   Irrigation Once    famotidine (PEPCID) injection  20 mg IntraVENous BID    sodium chloride flush  5-40 mL IntraVENous 2 times per day     Continuous Infusions:    sodium chloride       PRN Medicationsondansetron, 4 mg, Q8H PRN   Or  ondansetron, 4 mg, Q6H PRN  polyethylene glycol, 17 g, Daily PRN  acetaminophen, 650 mg, Q6H PRN   Or  acetaminophen, 650 mg, Q6H PRN  sodium chloride flush, 5-40 mL, PRN  sodium chloride, 25 mL, PRN  hydrALAZINE, 10 mg, Q6H PRN        Diagnostic Labs:  CBC:   Recent Labs     09/24/21  0346 09/25/21  0622 09/26/21  0417   WBC 11.9* 9.0 7.7   RBC 4.39 4.04 3.74*   HGB 14.0 13.0 11.9   HCT 43.5 39.9 37.4   MCV 99.1 98.8 100.0   RDW 14.1 13.9 14.0 Radiology Results [address] at 10:21 am on 9/25/2021to be communicated to a licensed caregiver. XR CHEST PORTABLE    Result Date: 9/24/2021  Stable lead placement Interval improvement in still moderate vascular congestion with stable moderate left pleural effusion and slight improvement in still moderate right basilar pleuroparenchymal process Slight decrease in size and mass effect of right superior mediastinal mass. Consider CT chest for further evaluation     XR CHEST PORTABLE    Result Date: 9/23/2021  No evidence of pneumothorax post pacemaker placement. Cardiomegaly with bilateral pulmonary effusions and pulmonary vascular congestion consistent with CHF. Increasing prominence of right paratracheal opacity which now appears to be displacing the trachea to the left which is a new finding when compared to previous. CT scan of the chest could be performed if there is any symptomatology such as stridor to exclude enlarging mass or hematoma. XR CHEST PORTABLE    Result Date: 9/22/2021  Right jugular transvenous pacer in place Findings suggest congestive heart failure     XR CHEST PORTABLE    Result Date: 9/22/2021  Bilateral pleural effusions with bibasilar opacities that could represent atelectasis or infection       ASSESSMENT & PLAN     ASSESSMENT / PLAN:     Principal Problem:    Ventricular tachycardia (HCC)  Active Problems:    Cardiac dysrhythmia    Syncope and collapse    Bradycardia    Fall at home    Bilateral Pleural effusion    Pedal edema  Resolved Problems:    * No resolved hospital problems.  *      Ventricular Tachycardia  - s/p emergent temporary placement due to severe bradycardia induced torsades and LHC today  - 09/23: s/p dual chamber permanent pacemaker placement  - LHC: Mild CAD; medical therapy for CAD  - Echo 09/22: LVEF 35-40% and evidence of diastolic dysfunction  - Cardiology following, will follow up recommedations  - EP consulted: s/p dual chamber pacemaker  - HR 60- stable        Severe sinus bradycardia- in paced rhythm now- HR 60s  - Patient's HR in 30s-40s in ED; ECG showed sinus bradycardia  - s/p emergent temporary placement due to severe bradycardia induced torsades   - patient currently in paced rhythm HR 60s  - Continue Lidocaine gtt per cardiology  - EP consulted:  s/p dual chamber permanent pacemaker placement        Syncope -resolved  - likely due to bradycardia at home  - CT Head: no acute intracranial abnorm  - Echo 09/22: LVEF 35-40% and evidence of diastolic dysfunction  - Cardiology following, will follow up recommedations        Bilateral Pleural effusion  - likely secondary to acute CHF  - CXR 09/24: shows interval improvement in still moderate vascular congestion with stable moderate left pleural effusion. Slight decrease in size and mass effect of right superior mediastinal mass. - CT-Chest 09/25: shows moderate bilateral effusions; edema likely; pneumonia cannot be excluded; mediastinal mass- large nodule off right lobe of thyroid gland; large postprocedural hematoma at the insertion site of generator of the pacemaker; small pericardial effusion.   - Echo 09/22: LVEF 35-40% and evidence of diastolic dysfunction  - Received Lasix 20 mg IV yesterday;  Uout 1.4L/24 h  - on 3L NC oxygen, saturating >94%  - will give 1 dose Lasix today        Paroxysmal A.fib (on Eliquis 5 mg BID at home)  - Patient is in paced rhythm after pacemaker- HR 60 NSR  - Stopped Amiodarone due to prolonged QT per Cardiology  - Eliquis on HOLD due to hematoma  - Continue Metoprolol 25 mg BID per Cardiology       Hematoma around pacemaker site  - CT Chest showed large postprocedural hematoma at the insertion site of generator of the pacemaker  - Cardiology was informed and plan for possible evacuation of hematoma around the pacemaker placement site after reexploration of pacemaker pocket;  - Eliquis on HOLD per cardiology due to hematoma  - Hgb 11.9<--13<--14      Hyperthyroidism   - TSH 0.11; Free T4 3.08  - Started on Methimazole 5 mg daily  - Chest CT shows thyroid nodule  - Recommend outpatient US thyroid  - Follow-up with Endocrine as outpatient      DVT ppx : EPC cuffs; Eliquis on HOLD due to hematoma  GI ppx: Pepcid    PT/OT: On board  Discharge Planning / Lesley Armstrong: Patient will go [facility] upon discharge    Aditya Rogel MD  Internal Medicine Resident, PGY-1  [facility];  [place], [place]  9/26/2021, 7:16 AM

## 2021-09-26 NOTE — PROGRESS NOTES
[facility]   Progress Note                 Date:   9/26/2021  Patient name:  Sammie Burgos  Date of admission:  9/22/2021 10:22 AM  MRN:   [id number]  YOB: 1935  PCP:    oJseph Gomes MD    Reason for Admission: Syncope and collapse [R55]  Bradycardia [R00.1]  Ventricular tachycardia ([address] [I47.2]  Cardiac dysrhythmia [I49.9]      Subjective:       Clinical Changes / Abnormalities:     Has large hematoma at pacemaker site  Hb 11.9 today     I/O last 3 completed shifts: In: 400 [P.O.:400]  Out: 1400 [Urine:1400]  No intake/output data recorded. Medications:   Scheduled Meds:    metoprolol succinate  25 mg Oral BID    potassium chloride  20 mEq Oral Daily    methIMAzole  5 mg Oral Daily    [Held by provider] apixaban  5 mg Oral BID    losartan  50 mg Oral Daily    And    hydroCHLOROthiazide  12.5 mg Oral Daily    vancomycin  1,000 mg IntraVENous Once    irrigation builder   Irrigation Once    famotidine (PEPCID) injection  20 mg IntraVENous BID    sodium chloride flush  5-40 mL IntraVENous 2 times per day     Continuous Infusions:    sodium chloride       CBC:   Recent Labs     09/24/21  0346 09/25/21  0622 09/26/21  0417   WBC 11.9* 9.0 7.7   HGB 14.0 13.0 11.9   PLT See Reflexed IPF Result See Reflexed IPF Result 128*     BMP:    Recent Labs     09/24/21  0346 09/25/21  0622 09/26/21  0417    139 133*   K 3.6* 3.8 3.8    100 97*   CO2 26 31 30   BUN 16 12 13   CREATININE 0.50 0.49* 0.46*   GLUCOSE 120* 108* 102*     Hepatic:   No results for input(s): AST, ALT, ALB, BILITOT, ALKPHOS in the last 72 hours. Troponin:   No results for input(s): TROPHS in the last 72 hours. BNP: No results for input(s): BNP in the last 72 hours. Lipids: No results for input(s): CHOL, HDL in the last 72 hours. Invalid input(s): LDLCALCU  INR: No results for input(s): INR in the last 72 hours.     Objective:   Vitals: BP (!) 125/57   Pulse 61   Temp 97.8 °F (36.6 °C) (Oral)   Resp 23   Ht 5' 5\" (1.651 m)   Wt 165 lb 9.1 oz (75.1 kg)   SpO2 95%   BMI 27.55 kg/m²   General appearance: Alert. No acute distress. HEENT: Head: Normal, normocephalic, atraumatic. Neck: no JVD, trachea midline  Lungs: Clear to auscultation bilaterally, no wheeze or crackles  Heart: Regular rate and rhythm, S1, S2 normal, no murmur  Abdomen: Soft, non-tender  Extremities: No edema  Neurologic: No focal neurologic deficits    STRESS:    ECHO:  Global left ventricular systolic function is moderately reduced. Estimated EF 35-40%. Calculated ejection fraction 37% by Estrada's method. Dyskinetic inferoseptal and anteroseptal walls (likely from paced rhythm)  Evidence of diastolic dysfunction. Normal right ventricular size and function. Left atrium is moderately dilated. Moderate eccentric mitral regurgitation. Moderate tricuspid regurgitation. Estimated right ventricular systolic pressure is 39VKHI. Small to moderate posterior pericardial effusion seen with no echo signs of  tamponade. Left pleural effusion. CATH:  Angiographic Findings      Cardiac Arteries and Lesion Findings     LMCA: Mild irregularities 10-20%.     LAD: Mild irregularities 20-30%.     LCx: Mild irregularities 10-20%.     RCA: Mild irregularities 10-20%.     Coronary Tree      Dominance: Right         Assessment:   1. Syncopal episode  2    one episode of sustained ventricular tachycardia resolved with amiodarone  3    sinus bradycardia requiring temporary pacemaker. Underlying rhythm was 2-1 AV block. 4    runs of PVCs and nonsustained V. Tach  5    history of paroxysmal atrial fibrillation. 6   nonischemic cardiomyopathy EF of 35 to 40%. 7 Hematoma at pacemaker incision site         Plan / Recommendations:   1 patient had PPM placement. PPM interrogated. Large hematoma noted at pacemaker site. Keep NPO after midnight. Will need reexploration of pacemaker pocket tomorrow. Eliquis on hold.    2 metoprolol 25 mg twice daily. 3 on losartan hydrochlorothiazide. Thank you for allowing us to participate in [facility]. Electronically signed on 09/26/21 at 7:03 AM by:    Almas Koroma MD, MD   Fellow, [address]    I performed a history and physical examination of the patient and discussed management with the resident. I reviewed the residents note and agree with the documented findings and plan of care. Any areas of disagreement are noted on the chart. I was personally present for the key portions of any procedures. I have documented in the chart those procedures where I was not present during the key portions. I have personally evaluated this patient and have completed at least one if not all key elements of the E/M (history, physical exam, and MDM). Additional findings are as noted. Pressure dressing at [facility] site. Hematoma looks somewhat better. Eliquis has been hold. Will discuss with EP the need for pocket revision tomorrow.      Viridiana Coyne MD

## 2021-09-26 NOTE — PLAN OF CARE
Problem: Skin Integrity:  Goal: Will show no infection signs and symptoms  Description: Will show no infection signs and symptoms  9/26/2021 1844 by Christa Silva RN  Outcome: Ongoing  9/26/2021 0721 by Chey Moses RN  Outcome: Ongoing  Goal: Absence of new skin breakdown  Description: Absence of new skin breakdown  9/26/2021 1844 by Christa Silva RN  Outcome: Ongoing  9/26/2021 0721 by Chey Moses RN  Outcome: Ongoing     Problem: Falls - Risk of:  Goal: Will remain free from falls  Description: Will remain free from falls  9/26/2021 1844 by Christa Silva RN  Outcome: Ongoing  9/26/2021 0721 by Chey Moses RN  Outcome: Ongoing  Goal: Absence of physical injury  Description: Absence of physical injury  9/26/2021 1844 by Christa Silva RN  Outcome: Ongoing  9/26/2021 0721 by Chey Moses RN  Outcome: Ongoing     Problem: Infection:  Goal: Will remain free from infection  Description: Will remain free from infection  9/26/2021 1844 by Christa Silva RN  Outcome: Ongoing  9/26/2021 0721 by Chey Moses RN  Outcome: Ongoing

## 2021-09-26 NOTE — PROGRESS NOTES
Speech Language Pathology  Facility/Department: [facility]   CLINICAL BEDSIDE SWALLOW EVALUATION    NAME: Preeti Montero  : 1935  MRN: [id number]    ADMISSION DATE: 2021  ADMITTING DIAGNOSIS: has Cardiac dysrhythmia; Syncope and collapse; Ventricular tachycardia ([address] Bradycardia; Fall at home; Bilateral Pleural effusion; and Pedal edema on their problem list.    Recent Chest Xray/CT of Chest: 2021    Impression   Stable lead placement       Interval improvement in still moderate vascular congestion with stable   moderate left pleural effusion and slight improvement in still moderate right   basilar pleuroparenchymal process       Slight decrease in size and mass effect of right superior mediastinal mass. Consider CT chest for further evaluation     Date of Eval: 2021  Evaluating Therapist: AMARJIT Harley    Current Diet level:  Current Diet : Regular  Current Liquid Diet : Thin    Primary Complaint  The patient is a pleasant 81 y.o. female presents with a chief complaint of syncopal episode. The patient has PMH significant of hypertension. The patient reported that she has been feeling well since yesterday with decreased appetite, nausea and dry heaving. The patient's daughter called EMS this morning after she had a syncopal episode. She reported feeling dizzy after which she \"blacked out\" and fell and hit her head on the carpet. The patient did not have any abnormal shaking or jerking movements or urinary or fecal incontinence. Per EMS, the patient was in sustained V. tach and she was given 150 mg of amiodarone after which V. tach was spontaneously resolved. Patient reported taking all her medications in the morning. Patient denies any cardiac history or following up with any cardiologist.     In the ED, the patient found to be bradycardic HR 48, RR 25, /52 SPO2 92% on room air. EKG showed bradycardia bigeminy no ST elevation was seen.  Labs showed K 3.1, Trop 24, Pro-BNP 6816, WBC 12.9, Hgb 15.8, Plt 154. Patient was started on amiodarone drip and potassium 100 mEq, Mag 2g and calcium 2g was replaced. Cardiology was consulted and patient was having 4-8 beat runs of V. Tach in the ED. Subsequently, patient's blood pressure was dropped to 77/65. Cardiolgy stopped amiodarone and switched to Dopamine and also resumed Lidocaine. Patient was taken for emergent temporary pacemaker for possible bradycardic induced torsades and left heart cath. Patient was admitted to cardiac ICU for further management. Pain:  Pain Assessment  Pain Assessment: 0-10  Pain Level: 0  Patient's Stated Pain Goal: No pain    Reason for Referral  Ham Morrison was referred for a bedside swallow evaluation to assess the efficiency of her swallow function, identify signs and symptoms of aspiration and make recommendations regarding safe dietary consistencies, effective compensatory strategies, and safe eating environment. Impression  Patient presents with probable safe swallow for Regular diet with thin liquids as evidenced by no overt s/s of aspiration noted with consistencies tested. Pt edentulous with dentures in place when ST arrived. Pt reports she always uses dentures during mealtimes. Recommend small sips and bites, only feed when alert and awake and upright at 90 degrees for all PO intake. Recommend close monitoring for overt/clinical s/s of aspiration and D/C PO intake and complete Modified Barium Swallow Study should they occur. Results and recommendations reported to RN. Dysphagia Diagnosis: Swallow function appears grossly intact  Dysphagia Outcome Severity Scale: Level 6: Within functional limits/Modified independence     Treatment Plan  Requires SLP Intervention: Yes  Duration/Frequency of Treatment: 1-2x per week  D/C Recommendations: Further therapy recommended at discharge.     Recommended Diet and Intervention  Diet Solids Recommendation: Regular  Liquid Consistency Recommendation: Thin  Recommended Form of Meds: PO  Therapeutic Interventions: Diet tolerance monitoring;Patient/Family education    Compensatory Swallowing Strategies  Compensatory Swallowing Strategies: Small bites/sips;Upright as possible for all oral intake    Treatment/Goals  Dysphagia Goals: The patient will tolerate recommended diet without observed clinical signs of aspiration    General  Chart Reviewed: Yes  Behavior/Cognition: Alert; Cooperative  Temperature Spikes Noted: No  Respiratory Status: Room air  O2 Device: None (Room air)  Communication Observation: Functional  Follows Directions: Simple  Dentition: Dentures top;Dentures bottom  Patient Positioning: Upright in chair  Baseline Vocal Quality: Normal  Consistencies Administered: Reg solid; Dysphagia Soft and Bite-Sized (Dysphagia III); Dysphagia Pureed (Dysphagia I); Nectar - straw; Thin - straw    Vision/Hearing  Vision  Vision: Within Functional Limits  Hearing  Hearing: Within functional limits    Oral Motor Deficits  Oral/Motor  Oral Motor:  Within functional limits    Oral Phase Dysfunction  Oral Phase  Oral Phase: WFL  Oral Phase  Oral Phase - Comment: Bolus manipulation and oral transit appear WFL for all consistencies tested     Indicators of Pharyngeal Phase Dysfunction   Pharyngeal Phase  Pharyngeal Phase: WFL  Pharyngeal Phase   Pharyngeal: No overt s/s of aspiration with all consistencies tested, RN reports pt tolerated breakfast well this AM.    Prognosis  Prognosis  Prognosis for safe diet advancement: fair  Individuals consulted  Consulted and agree with results and recommendations: Patient;RN    Education  Patient Education: yes  Patient Education Response: Verbalizes understanding             Therapy Time  SLP Individual Minutes  Time In: 5810  Time Out: 0920  Minutes: 1538 AMARJIT Fontaine  9/26/2021 9:28 AM

## 2021-09-27 VITALS
DIASTOLIC BLOOD PRESSURE: 52 MMHG | HEART RATE: 60 BPM | WEIGHT: 165.34 LBS | RESPIRATION RATE: 22 BRPM | BODY MASS INDEX: 27.55 KG/M2 | HEIGHT: 65 IN | TEMPERATURE: 98.1 F | OXYGEN SATURATION: 94 % | SYSTOLIC BLOOD PRESSURE: 112 MMHG

## 2021-09-27 LAB
ABSOLUTE EOS #: 0.09 K/UL (ref 0–0.44)
ABSOLUTE IMMATURE GRANULOCYTE: 0.04 K/UL (ref 0–0.3)
ABSOLUTE LYMPH #: 0.76 K/UL (ref 1.1–3.7)
ABSOLUTE MONO #: 0.87 K/UL (ref 0.1–1.2)
ANION GAP SERPL CALCULATED.3IONS-SCNC: 7 MMOL/L (ref 9–17)
BASOPHILS # BLD: 0 % (ref 0–2)
BASOPHILS ABSOLUTE: <0.03 K/UL (ref 0–0.2)
BUN BLDV-MCNC: 13 MG/DL (ref 8–23)
BUN/CREAT BLD: ABNORMAL (ref 9–20)
CALCIUM SERPL-MCNC: 8.4 MG/DL (ref 8.6–10.4)
CHLORIDE BLD-SCNC: 100 MMOL/L (ref 98–107)
CO2: 31 MMOL/L (ref 20–31)
CREAT SERPL-MCNC: 0.55 MG/DL (ref 0.5–0.9)
DIFFERENTIAL TYPE: ABNORMAL
EOSINOPHILS RELATIVE PERCENT: 1 % (ref 1–4)
GFR AFRICAN AMERICAN: >60 ML/MIN
GFR NON-AFRICAN AMERICAN: >60 ML/MIN
GFR SERPL CREATININE-BSD FRML MDRD: ABNORMAL ML/MIN/{1.73_M2}
GFR SERPL CREATININE-BSD FRML MDRD: ABNORMAL ML/MIN/{1.73_M2}
GLUCOSE BLD-MCNC: 113 MG/DL (ref 70–99)
HCT VFR BLD CALC: 37.1 % (ref 36.3–47.1)
HEMOGLOBIN: 11.9 G/DL (ref 11.9–15.1)
IMMATURE GRANULOCYTES: 1 %
LYMPHOCYTES # BLD: 9 % (ref 24–43)
MCH RBC QN AUTO: 32.7 PG (ref 25.2–33.5)
MCHC RBC AUTO-ENTMCNC: 32.1 G/DL (ref 28.4–34.8)
MCV RBC AUTO: 101.9 FL (ref 82.6–102.9)
MONOCYTES # BLD: 11 % (ref 3–12)
NRBC AUTOMATED: 0 PER 100 WBC
PDW BLD-RTO: 13.9 % (ref 11.8–14.4)
PLATELET # BLD: ABNORMAL K/UL (ref 138–453)
PLATELET ESTIMATE: ABNORMAL
PLATELET, FLUORESCENCE: 108 K/UL (ref 138–453)
PLATELET, IMMATURE FRACTION: 13.1 % (ref 1.1–10.3)
PMV BLD AUTO: ABNORMAL FL (ref 8.1–13.5)
POTASSIUM SERPL-SCNC: 3.9 MMOL/L (ref 3.7–5.3)
RBC # BLD: 3.64 M/UL (ref 3.95–5.11)
RBC # BLD: ABNORMAL 10*6/UL
SEG NEUTROPHILS: 79 % (ref 36–65)
SEGMENTED NEUTROPHILS ABSOLUTE COUNT: 6.53 K/UL (ref 1.5–8.1)
SODIUM BLD-SCNC: 138 MMOL/L (ref 135–144)
WBC # BLD: 8.3 K/UL (ref 3.5–11.3)
WBC # BLD: ABNORMAL 10*3/UL

## 2021-09-27 PROCEDURE — 97166 OT EVAL MOD COMPLEX 45 MIN: CPT

## 2021-09-27 PROCEDURE — 2580000003 HC RX 258: Performed by: STUDENT IN AN ORGANIZED HEALTH CARE EDUCATION/TRAINING PROGRAM

## 2021-09-27 PROCEDURE — 36415 COLL VENOUS BLD VENIPUNCTURE: CPT

## 2021-09-27 PROCEDURE — 85055 RETICULATED PLATELET ASSAY: CPT

## 2021-09-27 PROCEDURE — 2500000003 HC RX 250 WO HCPCS: Performed by: STUDENT IN AN ORGANIZED HEALTH CARE EDUCATION/TRAINING PROGRAM

## 2021-09-27 PROCEDURE — 99239 HOSP IP/OBS DSCHRG MGMT >30: CPT | Performed by: INTERNAL MEDICINE

## 2021-09-27 PROCEDURE — 6370000000 HC RX 637 (ALT 250 FOR IP): Performed by: STUDENT IN AN ORGANIZED HEALTH CARE EDUCATION/TRAINING PROGRAM

## 2021-09-27 PROCEDURE — 6360000002 HC RX W HCPCS: Performed by: STUDENT IN AN ORGANIZED HEALTH CARE EDUCATION/TRAINING PROGRAM

## 2021-09-27 PROCEDURE — 97535 SELF CARE MNGMENT TRAINING: CPT

## 2021-09-27 PROCEDURE — 85025 COMPLETE CBC W/AUTO DIFF WBC: CPT

## 2021-09-27 PROCEDURE — 80048 BASIC METABOLIC PNL TOTAL CA: CPT

## 2021-09-27 RX ORDER — FUROSEMIDE 20 MG/1
20 TABLET ORAL DAILY
Qty: 30 TABLET | Refills: 1 | Status: SHIPPED | OUTPATIENT
Start: 2021-09-27 | End: 2021-09-27 | Stop reason: SDUPTHER

## 2021-09-27 RX ORDER — METOPROLOL SUCCINATE 25 MG/1
25 TABLET, EXTENDED RELEASE ORAL 2 TIMES DAILY
Qty: 30 TABLET | Refills: 3 | Status: SHIPPED | OUTPATIENT
Start: 2021-09-27

## 2021-09-27 RX ORDER — FUROSEMIDE 10 MG/ML
20 INJECTION INTRAMUSCULAR; INTRAVENOUS DAILY
Status: DISCONTINUED | OUTPATIENT
Start: 2021-09-27 | End: 2021-09-27 | Stop reason: HOSPADM

## 2021-09-27 RX ORDER — METHIMAZOLE 5 MG/1
5 TABLET ORAL DAILY
Qty: 30 TABLET | Refills: 0 | Status: SHIPPED | OUTPATIENT
Start: 2021-09-27 | End: 2021-10-27

## 2021-09-27 RX ORDER — FUROSEMIDE 20 MG/1
20 TABLET ORAL DAILY
Qty: 30 TABLET | Refills: 0 | Status: SHIPPED | OUTPATIENT
Start: 2021-09-27 | End: 2021-09-30

## 2021-09-27 RX ORDER — APIXABAN 5 MG/1
5 TABLET, FILM COATED ORAL 2 TIMES DAILY
Qty: 60 TABLET | Refills: 2 | Status: SHIPPED | OUTPATIENT
Start: 2021-10-04

## 2021-09-27 RX ADMIN — METHIMAZOLE 5 MG: 5 TABLET ORAL at 09:17

## 2021-09-27 RX ADMIN — FAMOTIDINE 20 MG: 10 INJECTION INTRAVENOUS at 09:17

## 2021-09-27 RX ADMIN — METOPROLOL SUCCINATE 25 MG: 25 TABLET, FILM COATED, EXTENDED RELEASE ORAL at 09:17

## 2021-09-27 RX ADMIN — LOSARTAN POTASSIUM 50 MG: 50 TABLET, FILM COATED ORAL at 09:17

## 2021-09-27 RX ADMIN — FUROSEMIDE 20 MG: 10 INJECTION, SOLUTION INTRAMUSCULAR; INTRAVENOUS at 09:21

## 2021-09-27 RX ADMIN — SODIUM CHLORIDE, PRESERVATIVE FREE 10 ML: 5 INJECTION INTRAVENOUS at 10:38

## 2021-09-27 RX ADMIN — HYDROCHLOROTHIAZIDE 12.5 MG: 25 TABLET ORAL at 09:21

## 2021-09-27 ASSESSMENT — PAIN SCALES - GENERAL
PAINLEVEL_OUTOF10: 0

## 2021-09-27 NOTE — PLAN OF CARE
Problem: Skin Integrity:  Goal: Will show no infection signs and symptoms  Description: Will show no infection signs and symptoms  9/27/2021 0157 by Javier June RN  Outcome: Ongoing  9/26/2021 1844 by Kyra Denton RN  Outcome: Ongoing  Goal: Absence of new skin breakdown  Description: Absence of new skin breakdown  9/27/2021 0157 by Javier June RN  Outcome: Ongoing  9/26/2021 1844 by Kyra Denton RN  Outcome: Ongoing     Problem: Falls - Risk of:  Goal: Will remain free from falls  Description: Will remain free from falls  9/27/2021 0157 by Javier June RN  Outcome: Ongoing  9/26/2021 1844 by Kyra Denton RN  Outcome: Ongoing  Goal: Absence of physical injury  Description: Absence of physical injury  9/27/2021 0157 by Javier June RN  Outcome: Ongoing  9/26/2021 1844 by Kyra Denton RN  Outcome: Ongoing     Problem: Infection:  Goal: Will remain free from infection  Description: Will remain free from infection  9/27/2021 0157 by Javier June RN  Outcome: Ongoing  9/26/2021 1844 by Kyra Denton RN  Outcome: Ongoing     Problem: Safety:  Goal: Free from accidental physical injury  Description: Free from accidental physical injury  9/27/2021 0157 by Javier June RN  Outcome: Ongoing  9/26/2021 1844 by Kyra Denton RN  Outcome: Ongoing  Goal: Free from intentional harm  Description: Free from intentional harm  9/27/2021 0157 by Javier June RN  Outcome: Ongoing  9/26/2021 1844 by Kyra Denton RN  Outcome: Ongoing     Problem: Daily Care:  Goal: Daily care needs are met  Description: Daily care needs are met  9/27/2021 0157 by Javier June RN  Outcome: Ongoing  9/26/2021 1844 by Kyra Denton RN  Outcome: Ongoing     Problem: Pain:  Goal: Patient's pain/discomfort is manageable  Description: Patient's pain/discomfort is manageable  9/27/2021 0157 by Javier June RN  Outcome: Ongoing  9/26/2021 1844 by Kyra Denton RN  Outcome: Ongoing     Problem: Skin Integrity:  Goal: Skin integrity will stabilize  Description: Skin integrity will stabilize  9/27/2021 0157 by Jose Juan Jones RN  Outcome: Ongoing  9/26/2021 1844 by Alex Cota RN  Outcome: Ongoing     Problem: Discharge Planning:  Goal: Patients continuum of care needs are met  Description: Patients continuum of care needs are met  9/27/2021 0157 by Jose Juan Jones RN  Outcome: Ongoing  9/26/2021 1844 by Alex Cota RN  Outcome: Ongoing

## 2021-09-27 NOTE — PLAN OF CARE
Problem: Skin Integrity:  Goal: Will show no infection signs and symptoms  Description: Will show no infection signs and symptoms  Outcome: Ongoing  Goal: Absence of new skin breakdown  Description: Absence of new skin breakdown  Outcome: Ongoing     Problem: Falls - Risk of:  Goal: Will remain free from falls  Description: Will remain free from falls  Outcome: Ongoing  Goal: Absence of physical injury  Description: Absence of physical injury  Outcome: Ongoing     Problem: Infection:  Goal: Will remain free from infection  Description: Will remain free from infection  Outcome: Ongoing     Problem: Safety:  Goal: Free from accidental physical injury  Description: Free from accidental physical injury  Outcome: Ongoing  Goal: Free from intentional harm  Description: Free from intentional harm  Outcome: Ongoing

## 2021-09-27 NOTE — DISCHARGE INSTR - COC
Continuity of Care Form    Patient Name: Heike Eller   :  1935  MRN:  [id number]    Admit date:  2021  Discharge date:  ***    Code Status Order: Full Code   Advance Directives:      Admitting Physician:  Le Olivier MD  PCP: Hanh Aldridge MD    Discharging Nurse: [facility] Unit/Room#: 1006/1006-01  Discharging Unit Phone Number: ***    Emergency Contact:   Extended Emergency Contact Information  Primary Emergency Contact: [address], [address] [place] [address] Phone: [phone]  Relation: Child  Secondary Emergency Contact: [address] [facility], [address] [place] [address] [address] Phone: [phone]  Relation: Child    Past Surgical History:  History reviewed. No pertinent surgical history. Immunization History:   Immunization History   Administered Date(s) Administered    COVID-19, Moderna, PF, 100mcg/0.5mL 2021, 2021       Active Problems:  Patient Active Problem List   Diagnosis Code    Cardiac dysrhythmia I49.9    Syncope and collapse R55    Ventricular tachycardia (HCC) I47.2    Bradycardia R00.1    Fall at home [address] [place], Y92.009    Bilateral Pleural effusion J90    Pedal edema R60.0       Isolation/Infection:   Isolation            No Isolation          Patient Infection Status       Infection Onset Added Last Indicated Last Indicated By Review Planned Expiration Resolved Resolved By    None active    Resolved    COVID-19 Rule Out 21 COVID-19, Rapid (Ordered)   21 Rule-Out Test Resulted            Nurse Assessment:  Last Vital Signs: /71   Pulse 60   Temp 98.3 °F (36.8 °C) (Oral)   Resp 22   Ht 5' 5\" (1.651 m)   Wt 165 lb 5.5 oz (75 kg)   SpO2 93%   BMI 27.51 kg/m²     Last documented pain score (0-10 scale): Pain Level: 0  Last Weight:   Wt Readings from Last 1 Encounters:   21 165 lb 5.5 oz (75 kg)     Mental Status:  oriented and alert    IV Access:  - Peripheral IV - site  L Cephalic, insertion date: ***    Nursing Mobility/ADLs:  Walking Assisted  Transfer  Assisted  Bathing  Assisted  Dressing  Assisted  Toileting  Assisted  Feeding  Assisted  Med Admin  Assisted  Med Delivery   none    Wound Care Documentation and Therapy:        Elimination:  Continence:   · Bowel: Yes  · Bladder: No  Urinary Catheter: None   Colostomy/Ileostomy/Ileal Conduit: No       Date of Last BM: ***    Intake/Output Summary (Last 24 hours) at 9/27/2021 1641  Last data filed at 9/27/2021 1100  Gross per 24 hour   Intake 120 ml   Output 1100 ml   Net -980 ml     I/O last 3 completed shifts: In: 120 [P.O.:120]  Out: 1450 [Urine:1450]    Safety Concerns:     None    Impairments/Disabilities:      None    Nutrition Therapy:  Current Nutrition Therapy:   - Oral Diet:  Carb Control 3 carbs/meal (1500kcals/day)    Routes of Feeding: Oral  Liquids: No Restrictions  Daily Fluid Restriction: no  Last Modified Barium Swallow with Video (Video Swallowing Test): not done    Treatments at the Time of Hospital Discharge:   Respiratory Treatments: ***  Oxygen Therapy:  is not on home oxygen therapy.   Ventilator:    - No ventilator support    Rehab Therapies: Physical Therapy  Weight Bearing Status/Restrictions: No weight bearing restirctions  Other Medical Equipment (for information only, NOT a DME order):  wheelchair  Other Treatments: ***    Patient's personal belongings (please select all that are sent with patient):  Jenny    RN SIGNATURE:  Electronically signed by Lila Mayen RN on 9/27/21 at 4:59 PM EDT    CASE MANAGEMENT/SOCIAL WORK SECTION    Inpatient Status Date: 9/22/2021    Readmission Risk Assessment Score:  Readmission Risk              Risk of Unplanned Readmission:  11           Discharging to Facility/ Agency   Name:   [address] [place] [address],[address] Details  [address] [facility] [address]       Phone: [phone]       Fax: [phone]        ·   · Address:  · Phone:  · Fax:    Dialysis Facility (if applicable) · Name:  · Address:  · Dialysis Schedule:  · Phone:  · Fax:    / signature: Electronically signed by Jenni Gusman RN on 9/27/21 at 4:41 PM EDT    PHYSICIAN SECTION    Prognosis: Fair    Condition at Discharge: Stable    Rehab Potential (if transferring to Rehab): Fair    Recommended Labs or Other Treatments After Discharge:   PT and OT eval and treat  Monitor vitals  Continue oxygen via nasal cannula 2 L continuous  Hold Eliquis for 1 week and then can resume if stable  Follow-up with cardiology  Continue methimazole and follow-up with endocrine    Physician Certification: I certify the above information and transfer of Ivan Yates  is necessary for the continuing treatment of the diagnosis listed and that she requires [facility] for greater 30 days.      Update Admission H&P: No change in H&P    PHYSICIAN SIGNATURE:  Electronically signed by Inez Smart MD on 9/27/21 at 4:52 PM EDT

## 2021-09-27 NOTE — PROGRESS NOTES
Physical Therapy        Physical Therapy Cancel Note      DATE: 2021    NAME: Preethi Duran  MRN: [id number]   : 1935      Patient not seen this date for Physical Therapy due to:    Patient Declined: pt frustrated this AM; she has a hematoma over her new pacemaker site and it going for surgery sometime today; she wants to get \"cleaned up\" and was hoping to go home later today; will check on pt later today or  since pt lives alone and is wearing a sling s/p pacemaker      Electronically signed by Dwayne Haider PT on 2021 at 9:00 AM

## 2021-09-27 NOTE — PROGRESS NOTES
[facility]   Progress Note                 Date:   9/27/2021  Patient name:  Liberty Mckinney  Date of admission:  9/22/2021 10:22 AM  MRN:   [id number]  YOB: 1935  PCP:    Davion Greene MD    Reason for Admission: Syncope and collapse [R55]  Bradycardia [R00.1]  Ventricular tachycardia ([address] [I47.2]  Cardiac dysrhythmia [I49.9]      Subjective:       Clinical Changes / Abnormalities:     Has large hematoma at pacemaker site  Hb 11.9 today     I/O last 3 completed shifts: In: 960 [P.O.:960]  Out: 2050 [Urine:2050]  No intake/output data recorded. Medications:   Scheduled Meds:    metoprolol succinate  25 mg Oral BID    methIMAzole  5 mg Oral Daily    [Held by provider] apixaban  5 mg Oral BID    losartan  50 mg Oral Daily    And    hydroCHLOROthiazide  12.5 mg Oral Daily    famotidine (PEPCID) injection  20 mg IntraVENous BID    sodium chloride flush  5-40 mL IntraVENous 2 times per day     Continuous Infusions:    sodium chloride       CBC:   Recent Labs     09/25/21  0622 09/26/21  0417 09/27/21  0434   WBC 9.0 7.7 8.3   HGB 13.0 11.9 11.9   PLT See Reflexed IPF Result 128* See Reflexed IPF Result     BMP:    Recent Labs     09/25/21  0622 09/26/21  0417    133*   K 3.8 3.8    97*   CO2 31 30   BUN 12 13   CREATININE 0.49* 0.46*   GLUCOSE 108* 102*     Hepatic:   No results for input(s): AST, ALT, ALB, BILITOT, ALKPHOS in the last 72 hours. Troponin:   No results for input(s): TROPHS in the last 72 hours. BNP: No results for input(s): BNP in the last 72 hours. Lipids: No results for input(s): CHOL, HDL in the last 72 hours. Invalid input(s): LDLCALCU  INR: No results for input(s): INR in the last 72 hours. Objective:   Vitals: BP (!) 113/49   Pulse 60   Temp 99.7 °F (37.6 °C) (Oral)   Resp 22   Ht 5' 5\" (1.651 m)   Wt 165 lb 5.5 oz (75 kg)   SpO2 93%   BMI 27.51 kg/m²   General appearance: Alert. No acute distress.   HEENT: Head: Normal, normocephalic, atraumatic. Neck: no JVD, trachea midline  Lungs: Clear to auscultation bilaterally, no wheeze or crackles  Heart: Regular rate and rhythm, S1, S2 normal, no murmur  Abdomen: Soft, non-tender  Extremities: No edema  Neurologic: No focal neurologic deficits    STRESS:    ECHO:  Global left ventricular systolic function is moderately reduced. Estimated EF 35-40%. Calculated ejection fraction 37% by Estrada's method. Dyskinetic inferoseptal and anteroseptal walls (likely from paced rhythm)  Evidence of diastolic dysfunction. Normal right ventricular size and function. Left atrium is moderately dilated. Moderate eccentric mitral regurgitation. Moderate tricuspid regurgitation. Estimated right ventricular systolic pressure is 39ATMW. Small to moderate posterior pericardial effusion seen with no echo signs of  tamponade. Left pleural effusion. CATH:  Angiographic Findings      Cardiac Arteries and Lesion Findings     LMCA: Mild irregularities 10-20%.     LAD: Mild irregularities 20-30%.     LCx: Mild irregularities 10-20%.     RCA: Mild irregularities 10-20%.     Coronary Tree      Dominance: Right         Assessment:   1. Syncopal episode  2    one episode of sustained ventricular tachycardia resolved with amiodarone  3    sinus bradycardia requiring temporary pacemaker. Underlying rhythm was 2-1 AV block. 4    runs of PVCs and nonsustained V. Tach  5    history of paroxysmal atrial fibrillation. 6   nonischemic cardiomyopathy EF of 35 to 40%. 7 Hematoma at pacemaker incision site         Plan / Recommendations:   1 patient had PPM placement. PPM interrogated. Large hematoma noted at pacemaker site. Possible pocket revision today. Keep eliquis on hold. Keep NPO  2 Continue metoprolol 25 mg twice daily. 3 Continue on losartan and hydrochlorothiazide. Thank you for allowing us to participate in [address] [place] [address].      Electronically signed on 09/27/21 at 6:49 AM by:    Zuleyka Ashraf MD, MD   Fellow, [address]    I performed a history and physical examination of the patient and discussed management with the resident. I reviewed the residents note and agree with the documented findings and plan of care. Any areas of disagreement are noted on the chart. I was personally present for the key portions of any procedures. I have documented in the chart those procedures where I was not present during the key portions. I have personally evaluated this patient and have completed at least one if not all key elements of the E/M (history, physical exam, and MDM). Additional findings are as noted. Hematoma still. Reviewed with Dr. Consuelo Jacob. No intervention needed. Hold eliquis for a week and than resume. Continue metoprolol. Amiodarone has been discontinued due to prolonged QT. OK to discharge from cardiology perspective. Primary team has started patient on methamizole.   Issac Brumfield MD

## 2021-09-27 NOTE — PROGRESS NOTES
[facility]  Internal Medicine Teaching Residency Program  Inpatient Daily Progress Note  ______________________________________________________________________________    Patient: Alton Lala  YOB: 1935   ZXN:[id number]    Acct: [de-identified]     Room: [address] [place] [address]  Admit date: 9/22/2021  Today's date: 09/27/21  Number of days in the hospital: 5    SUBJECTIVE   Admitting Diagnosis: Ventricular tachycardia ([address]  CC: Syncopal episode    Pt examined at bedside. Chart & results reviewed. No acute events overnight. Patient remained afebrile and hemodynamically stable on 2 L of nasal cannula saturating over 92%. /59, HR 60, RR 22, SPO2 92% on 2 L  Uout 1.8L/24 hr; received lasix 20 mg IV yesterday  - Ordered Lasix 20 mg IV today; will monitor respiratory status  - Eliquis on HOLD per cardiology due to hematoma  - Hgb 11.9<--11.9<--13<--14  - possible pocket removal today; Keep NPO per Cardiology  Labs wnl     ROS:  Constitutional:  negative for chills, fevers, sweats  Respiratory:  negative for cough, dyspnea on exertion, hemoptysis, shortness of breath, wheezing  Cardiovascular:  negative for chest pain, chest pressure/discomfort, lower extremity edema, palpitations  Gastrointestinal:  negative for abdominal pain, constipation, diarrhea, nausea, vomiting  Neurological:  negative for dizziness, headache    BRIEF HISTORY     The patient is a pleasant 85 y. o. female presents with a chief complaint of syncopal episode.  The patient has PMH significant of hypertension.  The patient reported that she has been feeling well since yesterday with decreased appetite, nausea and dry heaving.  The patient's daughter called EMS this morning after she had a syncopal episode.  She reported feeling dizzy after which she \"blacked out\" and fell and hit her head on the carpet.  The patient did not have any abnormal shaking or jerking movements or urinary or fecal incontinence.  Per EMS, the patient was in sustained [place] and she was given 150 mg of amiodarone after which V. tach was spontaneously resolved. Patient reported taking all her medications in the morning.  Patient denies any cardiac history or following up with any cardiologist.     In the ED, the patient found to be bradycardic HR 48, RR 25, /52 SPO2 92% on room air.  EKG showed bradycardia bigeminy no ST elevation was seen. Labs showed K 3.1, Trop 24, Pro-BNP 6816, WBC 12.9, Hgb 15.8, Plt 154. Patient was started on amiodarone drip and potassium 100 mEq, Mag 2g and calcium 2g was replaced. Cardiology was consulted and patient was having 4-8 beat runs of V. Tach in the ED.  Subsequently, patient's blood pressure was dropped to 77/65. Cardiolgy stopped amiodarone and switched to Dopamine and also resumed Lidocaine.  Patient was taken for emergent temporary pacemaker for possible bradycardic induced torsades and left heart cath.  Patient was admitted to cardiac ICU for further management. OBJECTIVE     Vital Signs:  BP (!) 131/59   Pulse 60   Temp 98.1 °F (36.7 °C) (Oral)   Resp 22   Ht 5' 5\" (1.651 m)   Wt 165 lb 5.5 oz (75 kg)   SpO2 93%   BMI 27.51 kg/m²     Temp (24hrs), Av.6 °F (37 °C), Min:98.1 °F (36.7 °C), Max:99.7 °F (37.6 °C)    In: 120   Out: 850 [Urine:850]    Physical Exam:  Constitutional: This is a well developed, well nourished, 25-29.9 - Overweight 81y.o. year old female who is alert, oriented, cooperative and in no apparent distress. Head:normocephalic and atraumatic. EENT:  PERRLA. No conjunctival injections. Septum was midline, mucosa was without erythema, exudates or cobblestoning. No thrush was noted. Neck: Supple without thyromegaly. No elevated JVP. Trachea was midline. Respiratory: decreased breath sounds bilaterally  Cardiovascular: Regular without murmur, clicks, gallops or rubs. Abdomen: Slightly rounded and soft without organomegaly.  No rebound, rigidity or guarding was appreciated. Lymphatic: No lymphadenopathy. Musculoskeletal: Normal curvature of the spine. No gross muscle weakness. Extremities:  No lower extremity edema, ulcerations, tenderness, varicosities or erythema. Muscle size, tone and strength are normal.  No involuntary movements are noted. Skin:  Warm and dry. Good color, turgor and pigmentation. No lesions or scars. No cyanosis or clubbing  Neurological/Psychiatric: The patient's general behavior, level of consciousness, thought content and emotional status is normal.        Medications:  Scheduled Medications:    furosemide  20 mg IntraVENous Daily    metoprolol succinate  25 mg Oral BID    methIMAzole  5 mg Oral Daily    [Held by provider] apixaban  5 mg Oral BID    losartan  50 mg Oral Daily    And    hydroCHLOROthiazide  12.5 mg Oral Daily    famotidine (PEPCID) injection  20 mg IntraVENous BID    sodium chloride flush  5-40 mL IntraVENous 2 times per day     Continuous Infusions:    sodium chloride       PRN Medicationsondansetron, 4 mg, Q8H PRN   Or  ondansetron, 4 mg, Q6H PRN  polyethylene glycol, 17 g, Daily PRN  acetaminophen, 650 mg, Q6H PRN   Or  acetaminophen, 650 mg, Q6H PRN  sodium chloride flush, 5-40 mL, PRN  sodium chloride, 25 mL, PRN  hydrALAZINE, 10 mg, Q6H PRN        Diagnostic Labs:  CBC:   Recent Labs     09/25/21 0622 09/26/21 0417 09/27/21  0434   WBC 9.0 7.7 8.3   RBC 4.04 3.74* 3.64*   HGB 13.0 11.9 11.9   HCT 39.9 37.4 37.1   MCV 98.8 100.0 101.9   RDW 13.9 14.0 13.9   PLT See Reflexed IPF Result 128* See Reflexed IPF Result     BMP:   Recent Labs     09/25/21 0622 09/26/21 0417 09/27/21  0434    133* 138   K 3.8 3.8 3.9    97* 100   CO2 31 30 31   BUN 12 13 13   CREATININE 0.49* 0.46* 0.55     BNP: No results for input(s): BNP in the last 72 hours. PT/INR: No results for input(s): PROTIME, INR in the last 72 hours. APTT: No results for input(s):  APTT in the last 72 hours.  CARDIAC ENZYMES: No results for input(s): CKMB, CKMBINDEX, TROPONINI in the last 72 hours. Invalid input(s): CKTOTAL;3  FASTING LIPID PANEL:No results found for: CHOL, HDL, TRIG  LIVER PROFILE: No results for input(s): AST, ALT, ALB, BILIDIR, BILITOT, ALKPHOS in the last 72 hours. MICROBIOLOGY: No results found for: CULTURE    Imaging:    CT HEAD WO CONTRAST    Result Date: 9/23/2021  No acute intracranial abnormality. CT CHEST WO CONTRAST    Result Date: 9/25/2021  Mild cardiomegaly with mild pulmonary vascular congestion. Moderate bilateral pleural effusions are noted with associated dependent consolidation favored to represent atelectasis. Ground-glass opacities at the lung bases are favored to represent edema. Pneumonia cannot be excluded. Mediastinal mass appears to correspond to a large nodule off the right lobe of the thyroid gland. Comparison with outside studies if available would be useful. Thyroid ultrasound would be recommended although this may have limited utility due to intrathoracic extension of the suspected nodule. There is a large postprocedural hematoma at the insertion site of the generator of the pacemaker. Recommend continued follow-up on a clinical basis. Small pericardial effusion is noted. Incidental note of displacement of the right kidney superior to the dome of the liver. Findings of uncertain but doubtful clinical significance in the absence of additional history or clinical concerns. Nonobstructing left renal calculi. The findings were sent to the Radiology Results [address] at 10:21 am on 9/25/2021to be communicated to a licensed caregiver.      XR CHEST PORTABLE    Result Date: 9/24/2021  Stable lead placement Interval improvement in still moderate vascular congestion with stable moderate left pleural effusion and slight improvement in still moderate right basilar pleuroparenchymal process Slight decrease in size and mass effect of right superior mediastinal mass. Consider CT chest for further evaluation     XR CHEST PORTABLE    Result Date: 9/23/2021  No evidence of pneumothorax post pacemaker placement. Cardiomegaly with bilateral pulmonary effusions and pulmonary vascular congestion consistent with CHF. Increasing prominence of right paratracheal opacity which now appears to be displacing the trachea to the left which is a new finding when compared to previous. CT scan of the chest could be performed if there is any symptomatology such as stridor to exclude enlarging mass or hematoma. XR CHEST PORTABLE    Result Date: 9/22/2021  Right jugular transvenous pacer in place Findings suggest congestive heart failure     XR CHEST PORTABLE    Result Date: 9/22/2021  Bilateral pleural effusions with bibasilar opacities that could represent atelectasis or infection       ASSESSMENT & PLAN     ASSESSMENT / PLAN:     Principal Problem:    Ventricular tachycardia (HCC)  Active Problems:    Cardiac dysrhythmia    Syncope and collapse    Severe sinus bradycardia    Fall at home    Bilateral Pleural effusion    Pedal edema  Resolved Problems:    * No resolved hospital problems.  *    Ventricular Tachycardia  - s/p emergent temporary placement due to severe bradycardia induced torsades and LHC today  - 09/23: s/p dual chamber permanent pacemaker placement  - LHC: Mild CAD; medical therapy for CAD  - Echo 09/22: LVEF 35-40% and evidence of diastolic dysfunction  - Cardiology following, will follow up recommedations  - EP consulted: s/p dual chamber pacemaker  - HR 60- stable        Severe sinus bradycardia- in paced rhythm now- HR 60s  - Patient's HR in 30s-40s in ED; ECG showed sinus bradycardia  - s/p emergent temporary placement due to severe bradycardia induced torsades   - patient currently in paced rhythm HR 60s  - Continue Lidocaine gtt per cardiology  - EP consulted:  s/p dual chamber permanent pacemaker placement        Syncope -resolved  - likely due to bradycardia at home  - CT Head: no acute intracranial abnorm  - Echo 09/22: LVEF 35-40% and evidence of diastolic dysfunction  - Cardiology following, will follow up recommedations        Bilateral Pleural effusion  - likely secondary to acute CHF  - CXR 09/24: shows interval improvement in still moderate vascular congestion with stable moderate left pleural effusion. Slight decrease in size and mass effect of right superior mediastinal mass. - CT-Chest 09/25: shows moderate bilateral effusions; edema likely; pneumonia cannot be excluded; mediastinal mass- large nodule off right lobe of thyroid gland; large postprocedural hematoma at the insertion site of generator of the pacemaker; small pericardial effusion.   - Echo 09/22: LVEF 35-40% and evidence of diastolic dysfunction  - Received Lasix 20 mg IV yesterday; and ordered roday Uout 1.8L/24 h  - on 3L NC oxygen, saturating >94%  - will give 1 dose Lasix today        Paroxysmal A.fib (on Eliquis 5 mg BID at home)  - Patient is in paced rhythm after pacemaker- HR 60 NSR  - Stopped Amiodarone due to prolonged QT per Cardiology  - Eliquis on HOLD due to hematoma  - Continue Metoprolol 25 mg BID per Cardiology        Hematoma around pacemaker site  - CT Chest showed large postprocedural hematoma at the insertion site of generator of the pacemaker  - Cardiology was informed and plan for possible evacuation of hematoma around the pacemaker placement site after reexploration of pacemaker pocket;  - Eliquis on HOLD per cardiology due to hematoma  - Hgb 11.9<--11.9<--13<--14  - possible pocket removal today; Keep NPO per Cardiology        Hyperthyroidism   - TSH 0.11;  Free T4 3.08  - Started on Methimazole 5 mg daily  - Chest CT shows thyroid nodule  - Recommend outpatient US thyroid  - Follow-up with Endocrine as outpatient      DVT ppx : EPC Cuffs; Eliquis on HOLD  GI ppx: Pepcid    PT/OT: On board  Discharge Planning / Faby Rosado: Patient will go home upon discharge    [address] Arsalan Cook MD  Internal Medicine Resident, PGY-1  [address] [place], [place]  9/27/2021, 11:36 AM

## 2021-09-27 NOTE — PROGRESS NOTES
Occupational Therapy   Occupational Therapy Initial Assessment  Date: 2021   Patient Name: Kasandra Gallo  MRN: [id number]     : 1935    Date of Service: 2021    Discharge Recommendations:  Patient would benefit from continued therapy after discharge     Copied from Emergency Medicine:  Kasandra Gallo is a 81 y.o. female who presents with feeling unwell yesterday. Decreased appetite and dry heaving and nausea throughout the day. Called EMS this morning as patient continued to feel unwell. Had a syncopal episode after patient was placed on monitor noticed to be in V. tach. Patient then spontaneously resolved was given 150 of amiodarone by EMS. Arrives still feeling unwell heart rate in the 50s. Patient follows with Dr. Vanna Nicholas on PCP is on amiodarone metoprolol as well as Eliquis. Does not have a cardiologist has not had cardiac issues in the past.  Has not started any new recent medications. Assessment    Pt lying supine in bed upon entrance to room. Pt completed bed mobility with min assistance. Pt sat at eob 30 minutes with good unsupported sitting balance. Sit to stand with min assistance. Pt completed 2 L lateral side steps to [facility] with min assistance hand held. Please refer to below assist levels for adl completion. Pt ed on OT POC, safety awareness tech, proper hand placement for transfers, and energy conservation tech with fair return. Pt retired supine in bed with call light and phone in reach. All needs met upon exit. Dtrs also present upon exit. Performance deficits / Impairments: Decreased functional mobility ; Decreased safe awareness;Decreased ADL status; Decreased endurance  Treatment Diagnosis: Syncope and Collapse  Prognosis: Good  Decision Making: Medium Complexity  OT Education: OT Role;Plan of Care  Patient Education: Pt ed on OT POC, safety awareness tech, proper hand placement for transfers, and energy conservation tech with fair return  REQUIRES OT FOLLOW UP: Yes  Activity Primary  Laundry Responsibility: Primary  Cleaning Responsibility: Primary  Shopping Responsibility: Primary (when pt was not feeling well, dtr would take to the store. Pt able to mobilize throughout store pushing cart)  Ambulation Assistance: Independent  Transfer Assistance: Independent  Active : Yes  Occupation: Retired  Type of occupation:   Leisure & Hobbies: Reading the newspaper, watching gr kids sports-4 gr kids and 4 gr gr kids, watching tv  Additional Comments: Pts dtrs to take turns to stay with pt for the first week to assist as needed; Pts spouse has been in a SNF since 6/29/2021 with dementia     Objective   Vision: Impaired  Vision Exceptions: Wears glasses at all times (glasses are present in hospital)  Hearing: Exceptions to [facility]  Hearing Exceptions: Hard of hearing/hearing concerns    Orientation  Overall Orientation Status: Within Functional Limits     Balance  Sitting Balance: Independent (pt sat at EOB ~ 30 min with good unsupported sitting balance)  Standing Balance: Minimal assistance  Standing Balance  Time: ~ 2 min  Activity: static and dynamic  Functional Mobility  Functional - Mobility Device: No device (pt took 2 L lateral side steps to [facility] with hand held assist)  Assist Level: Minimal assistance  ADL  Feeding: Independent;Setup  Grooming: Independent;Setup  UE Bathing: Moderate assistance;Setup; Increased time to complete  LE Bathing: Moderate assistance;Setup; Increased time to complete  UE Dressing: Moderate assistance;Setup; Increased time to complete  LE Dressing: Moderate assistance;Setup; Increased time to complete  Additional Comments: Pt will need assistance with LUE due to PPM and unable to raise overhead  Tone RUE  RUE Tone: Normotonic  Coordination  Movements Are Fluid And Coordinated: No  Coordination and Movement description: Gross motor impairments; Left UE (due to recent PPM)     Bed mobility  Supine to Sit: Minimal assistance (for trunk progression)  Sit to Supine: Moderate assistance (for BLE progression)  Scooting: Minimal assistance (verbal cues not to push off with LUE)  Comment: HOB elevated  Transfers  Sit to stand: Minimal assistance  Stand to sit: Minimal assistance  Transfer Comments: hand held assistance     Cognition  Overall Cognitive Status: Exceptions  Following Commands:  Follows multistep commands with increased time  Safety Judgement: Decreased awareness of need for safety     Sensation  Overall Sensation Status: WFL (denies numbness/tingling B hands)      LUE PROM (degrees)  LUE General PROM: NT due to recent PPM  LUE AROM (degrees)  LUE General AROM: NT due to recent PPM  Left Hand PROM (degrees)  Left Hand PROM: WFL  Left Hand AROM (degrees)  Left Hand AROM: WFL  RUE PROM (degrees)  RUE PROM: WFL  RUE AROM (degrees)  RUE AROM : WFL  Right Hand PROM (degrees)  Right Hand PROM: WFL  Right Hand AROM (degrees)  Right Hand AROM: WFL  LUE Strength  L Hand General: 4-/5  LUE Strength Comment: NT due to recent PPM  RUE Strength  Gross RUE Strength: WFL  R Hand General: 4-/5  RUE Strength Comment: 4-/5 overall muscle strength      Plan   Plan  Times per week: 3-4 x week  Current Treatment Recommendations: Endurance Training, Patient/Caregiver Education & Training, Self-Care / ADL, Home Management Training, Safety Education & Training, Functional Mobility Training    AM-PAC Score  -[facility] Inpatient Daily Activity Raw Score: 16 (09/27/21 1548)  AM-PAC Inpatient ADL T-Scale Score : 35.96 (09/27/21 1548)  ADL Inpatient CMS 0-100% Score: 53.32 (09/27/21 1548)  ADL Inpatient CMS G-Code Modifier : CK (09/27/21 1548)    Goals  Short term goals  Time Frame for Short term goals: Pt will, by discharge:  Short term goal 1: Dem good safety awareness tech during functional transfers/tasks  Short term goal 2: Dem UB adls with CGA  Short term goal 3: Dem LB adls with CGA utilizing energy conservation tech  Short term goal 4: Den SBA for functional transfers  Short term goal 5: Dem functional mobility with SBA for adl performance  Short term goal 6: Dem an 8 min dynamic standing task with <2 rest breaks to increase activity tolerance       Therapy Time   Individual Concurrent Group Co-treatment   Time In 1404         Time Out 1457         Minutes 53         Timed Code Treatment Minutes: [address], OTR/L

## 2021-09-27 NOTE — CARE COORDINATION
Transitional Planning  Talked with pt about [facility]   Provided her with list for freedom of choice  [facility] is her choice  E-referral sent called spoke with Salud Hassan they are able to accept.   Perfect served Dr Rivas Flood for home care order and sign sherwin.        16:55  Updated Maureen Sauer home care set up she will need to complete [address] [place]      Discharge Report    [facility] Case Management Department  Written by: Dru Cotter RN    Patient Name: Radha Prosper  Attending Provider: Carl Coronado MD  Admit Date: 2021 10:22 AM  MRN: [id number]  Account: [de-identified]                     : 1935  Discharge Date:       Disposition: home with Sydney Garrett RN

## 2021-09-27 NOTE — PROGRESS NOTES
Patient taken off telemetry, left peripheral IV taken out w/o difficulty, pt educated about medications and discharge paperwork. Patient and daughter both at bedside and paperwork given to them. Patient taken down to car x1 assist via wheelchair to daughter's car. Patient left hospital in safe condition.

## 2021-09-29 NOTE — DISCHARGE SUMMARY
[address] [facility]     Department of Internal Medicine - Staff Internal Medicine Teaching Service    INPATIENT DISCHARGE SUMMARY      Patient Identification:  Kasandra Burger is a 81 y.o. female. :  1935  MRN: [id number]     Acct: [de-identified]   PCP: Gustavo Junior MD  Admit Date:  2021  Discharge date and time: 2021  9:23 PM   Attending Provider: Dr. Elena Graham Problem Lists:  Principal Problem:    Ventricular tachycardia [facility])  Active Problems:    Cardiac dysrhythmia    Syncope and collapse    Severe sinus bradycardia    Fall at home    Bilateral Pleural effusion    Pedal edema  Resolved Problems:    * No resolved hospital problems. *      HOSPITAL STAY     Brief Inpatient course:   Kasandra Burger is a 81 y.o. female who was admitted for the management of Ventricular tachycardia ([facility] [address] presented to the emergency department with a chief complaint of syncopal episode.  The patient has PMH significant of hypertension.  The patient reported that she has been feeling well since yesterday with decreased appetite, nausea and dry heaving.  The patient's daughter called EMS this morning after she had a syncopal episode.  She reported feeling dizzy after which she \"blacked out\" and fell and hit her head on the carpet.  The patient did not have any abnormal shaking or jerking movements or urinary or fecal incontinence.  Per EMS, the patient was in sustained V. tach and she was given 150 mg of amiodarone after which V. tach was spontaneously resolved. Patient reported taking all her medications in the morning.  Patient denies any cardiac history or following up with any cardiologist.    Patient was admitted for V.tach and symptomatic bradycardia management. She received emergent temporary pacemaker placement due to severe bradycardia-induced torsades.   LHC showed mild CAD and was recommended medical therapy per cardiology. Patient received dual-chamber permanent pacemaker placed on 09/23/2021. No further episodes of V. tach or bradycardia were noted during the admission. Patient was initially started on lidocaine drip which was discontinued. Syncope resolved. Echo on 09/22/2021 showed LVEF 35 to 40% and evidence of diastolic dysfunction. Chest x-ray initially showed bilateral pleural effusions likely secondary to decompensated CHF. CT chest on 09/25 showed moderate bilateral effusions and a large nodule of right lobe of thyroid gland a large postprocedure, at the insertion site of the generator of the pacemaker. Hematoma was observed per cardiology hemoglobin remained stable. Cardiology recommended to hold Eliquis for a week and then resume. Do not was discontinued due to prolonged QT metoprolol was started. Patient was also started on methimazole due to hyperthyroidism. She was recommended to follow-up with endocrinologist within 1 week and PCP within 1 week. Patient was also recommended to follow-up with cardiology within 2 weeks.      Procedures/ Significant Interventions:    Temporary pacemaker on 09/22  Dual chamber permanent pacemaker on 09/23  [facility] 09/23      Consults:     Consults:     Final Specialist Recommendations/Findings:   IP CONSULT TO CARDIOLOGY  IP CONSULT TO CRITICAL CARE  IP CONSULT TO INTERNAL MEDICINE  IP CONSULT TO CASE MANAGEMENT  IP CONSULT TO HOME CARE NEEDS      Any Hospital Acquired Infections: none    Discharge Functional Status:  stable    DISCHARGE PLAN     Disposition: home    Patient Instructions:   Discharge Medication List as of 9/27/2021  6:06 PM      START taking these medications    Details   methIMAzole (TAPAZOLE) 5 MG tablet Take 1 tablet by mouth daily, Disp-30 tablet, R-0Normal         CONTINUE these medications which have CHANGED    Details   ELIQUIS 5 MG TABS tablet Take 1 tablet by mouth 2 times daily, Disp-60 tablet, R-2, DAWNormal      furosemide (LASIX) 20 MG tablet Take 1 tablet by mouth daily for 3 days Take lasix for 3 days and then as needed for worsening shortness of breath and leg edema, Disp-30 tablet, R-0Print      metoprolol succinate (TOPROL XL) 25 MG extended release tablet Take 1 tablet by mouth 2 times daily, Disp-30 tablet, R-3Print         CONTINUE these medications which have NOT CHANGED    Details   escitalopram (LEXAPRO) 5 MG tablet Take 5 mg by mouth dailyHistorical Med      losartan-hydroCHLOROthiazide (HYZAAR) 50-12.5 MG per tablet Take 1 tablet by mouth dailyHistorical Med      Calcium-Vitamin D 600-200 MG-UNIT TABS Take 1 tablet by mouth 2 times daily Historical Med         STOP taking these medications       amiodarone (CORDARONE) 200 MG tablet Comments:   Reason for Stopping:               Activity: activity as tolerated    Diet: cardiac diet    Follow-up:    José Miguel Pierre MD  [address] [facility] [address] [facility]. [address]  [phone]    Schedule an appointment as soon as possible for a visit in 2 weeks  Post hospitalization follow up. Edison Poe MD  [address] [facility]  [address]  [phone]      Wound check -- Monday, October 4 at 11:30 at [facility]    Edison Poe MD  [address] [facility]  [address]  [address]  [phone]      Dr. Dequan Gallo -- Wednesday, October 27 at 1:30 at [facility] office -- use Entrance C       Patient Instructions:    Stop taking amiodarone for now, do not take Eliquis for 5 days and can start after 5 days if stable. follow up with [facility] in 1 week for follow up. Take toprol XL 25 mg twice daily and hyzaar as previously prescribed. You were found to have right thyroid nodule, start taking Methimazole daily and follow up with Endocrinology and PCP outpatient, call to schedule appointment on the number provided. Use supplemental oxygen to keep O2>92%.  Continue to take lasix 40 mg daily (2 tablets) for 2 days, then 20 mg daily (1 tablet) thereafter. Do not take eliquis for 5 days due to concern for hematoma, start taking after 5 days and call [facility] is case of bleeding from the pacemaker side or worsening swelling. Follow up labs: N/A    Follow up imaging: N/A        Aquilino Cortez MD,  Internal Medicine Resident, PGY-1  [facility];  [place], [place]  9/29/2021, 2:22 PM

## 2025-03-03 NOTE — PROGRESS NOTES
Physician Progress Note      PATIENT:               Humza Case  CSN #:                  [id number]  :                       1935  ADMIT DATE:       2021 10:22 AM  DISCH DATE:        2021 9:23 PM  RESPONDING  PROVIDER #:        Raymon Luciano          QUERY TEXT:    Pt admitted with VTach/Bradycardia and has Acute CHF documented. If possible,   please document in progress notes and discharge summary further specificity   regarding the type of CHF:    The medical record reflects the following:  Risk Factors: HTN,CAD  Clinical Indicators: Echo on 2021 showed LVEF 35 to 40% and evidence of   diastolic dysfunction. Chest x-ray initially showed bilateral pleural   effusions likely secondary to decompensated CHF. CT chest on  showed   moderate bilateral effusions . Per progress notes ' Acute CHF' documented. Per   DC Sum '  bilateral pleural effusions likely secondary to decompensated CHF.'    [address] [id number]. Treatment: Cardiology consult, heart cath,pacemaker placement, IV Lasix, HCTZ,   ICU Monitoring  Options provided:  -- Acute Systolic CHF/HFrEF  -- Acute Diastolic CHF/HFpEF  -- Acute Systolic and Diastolic CHF  -- Other - I will add my own diagnosis  -- Disagree - Not applicable / Not valid  -- Disagree - Clinically unable to determine / Unknown  -- Refer to Clinical Documentation Reviewer    PROVIDER RESPONSE TEXT:    This patient is in acute systolic CHF/HFrEF.     Query created by: Estelle Cantrell on 10/1/2021 12:53 PM      Electronically signed by:  Raymon Luciano 10/2/2021 9:20 AM Spoke with Tricia and verbalized information below. Tricia verbalized understanding, no questions at this time